# Patient Record
Sex: FEMALE | Race: WHITE | HISPANIC OR LATINO | Employment: UNEMPLOYED | URBAN - METROPOLITAN AREA
[De-identification: names, ages, dates, MRNs, and addresses within clinical notes are randomized per-mention and may not be internally consistent; named-entity substitution may affect disease eponyms.]

---

## 2017-05-09 ENCOUNTER — NON-PROVIDER VISIT (OUTPATIENT)
Dept: OBGYN | Facility: CLINIC | Age: 32
End: 2017-05-09

## 2017-05-09 DIAGNOSIS — Z32.01 POSITIVE PREGNANCY TEST: ICD-10-CM

## 2017-05-09 LAB
INT CON NEG: NEGATIVE
INT CON POS: POSITIVE
POC URINE PREGNANCY TEST: POSITIVE

## 2017-05-09 PROCEDURE — 81025 URINE PREGNANCY TEST: CPT | Performed by: OBSTETRICS & GYNECOLOGY

## 2017-05-11 ENCOUNTER — APPOINTMENT (OUTPATIENT)
Dept: OBGYN | Facility: CLINIC | Age: 32
End: 2017-05-11
Payer: COMMERCIAL

## 2017-06-20 ENCOUNTER — INITIAL PRENATAL (OUTPATIENT)
Dept: OBGYN | Facility: CLINIC | Age: 32
End: 2017-06-20

## 2017-06-20 VITALS
HEIGHT: 63 IN | WEIGHT: 128 LBS | DIASTOLIC BLOOD PRESSURE: 60 MMHG | BODY MASS INDEX: 22.68 KG/M2 | SYSTOLIC BLOOD PRESSURE: 90 MMHG

## 2017-06-20 DIAGNOSIS — O20.0 THREATENED ABORTION: ICD-10-CM

## 2017-06-20 DIAGNOSIS — Z34.80 SUPERVISION OF OTHER NORMAL PREGNANCY, ANTEPARTUM: ICD-10-CM

## 2017-06-20 DIAGNOSIS — Z34.00 SUPERVISION OF NORMAL FIRST PREGNANCY, ANTEPARTUM: ICD-10-CM

## 2017-06-20 LAB
APPEARANCE UR: NORMAL
BILIRUB UR STRIP-MCNC: NORMAL MG/DL
COLOR UR AUTO: NORMAL
GLUCOSE UR STRIP.AUTO-MCNC: NEGATIVE MG/DL
KETONES UR STRIP.AUTO-MCNC: NEGATIVE MG/DL
LEUKOCYTE ESTERASE UR QL STRIP.AUTO: NEGATIVE
NITRITE UR QL STRIP.AUTO: NEGATIVE
PH UR STRIP.AUTO: 5 [PH] (ref 5–8)
PROT UR QL STRIP: NORMAL MG/DL
RBC UR QL AUTO: NORMAL
SP GR UR STRIP.AUTO: 1.02
UROBILINOGEN UR STRIP-MCNC: NORMAL MG/DL

## 2017-06-20 PROCEDURE — 59401 PR NEW OB VISIT: CPT | Performed by: NURSE PRACTITIONER

## 2017-06-20 PROCEDURE — 81002 URINALYSIS NONAUTO W/O SCOPE: CPT | Performed by: NURSE PRACTITIONER

## 2017-06-20 NOTE — PROGRESS NOTES
Pt. Here for NOB visit today.  # [phone]  First prenatal care  Pt. States no complaints.   Pharmacy verified.

## 2017-06-20 NOTE — PROGRESS NOTES
"S:  Kait Talley is a 32 y.o.  who presents for her new OB exam.  She is 13w3d with and FRANKI of Estimated Date of Delivery: 17 by sure LMP. She has no complaints. Denies n/v, vaginal bleeding or leaking of fluid. She is currently not working outside the home. No  heavy lifting or chemical exposure. No ER visits or previous care in this pregnancy.     Too early AFP.  declines CF.  Denies VB, LOF, or cramping.  Denies dysuria, vaginal DC. Reports no fetal movement.     Pt is  and lives with FOB.  Pregnancy is desired.      History reviewed. No pertinent past medical history.  History reviewed. No pertinent family history.  Social History     Social History   • Marital Status:      Spouse Name: N/A   • Number of Children: N/A   • Years of Education: N/A     Occupational History   • Not on file.     Social History Main Topics   • Smoking status: Never Smoker    • Smokeless tobacco: Not on file   • Alcohol Use: No   • Drug Use: No   • Sexual Activity:     Partners: Male     Birth Control/ Protection: Implant      Comment: Pt states had nexplanon removed 2016     Other Topics Concern   • Not on file     Social History Narrative   • No narrative on file     OB History    Para Term  AB SAB TAB Ectopic Multiple Living   3 2 2       2      # Outcome Date GA Lbr Eleuterio/2nd Weight Sex Delivery Anes PTL Lv   3 Current            2 Term 07 40w0d  3.175 kg (7 lb) F Vag-Spont EPI N Y      Comments: Pt states no complications.    1 Term 05 40w0d  3.175 kg (7 lb) F Vag-Spont EPI N Y      Comments: Pt states no complications.           History of HSV I or II in self or partner: no  History of Thyroid problems: no    O:    Filed Vitals:    17 1510   BP: 90/60   Height: 1.6 m (5' 3\")   Weight: 58.06 kg (128 lb)      See Prenatal Physical.    Wet mount: none    No fetal heart tones seen on bedside scan.       A:   1.  IUP @ 13w3d per sure LMP        2.  S=D        3.  See " problem list below        4.  Needs transvaginal viability scan for missed AB vs early IUP       Patient Active Problem List    Diagnosis Date Noted   • Supervision of other normal pregnancy 06/20/2017         P:  1.  GC/CT & pap held at this time until pregnancy verification        2.  Prenatal labs -  Held at this time until pregnancy verification        3.  Discussed PNV, diet, avoidances and adequate water intake        4.  NOB packet given        5.  Return to office for viability scan first available        6.  Complete OB US - TBD            No orders of the defined types were placed in this encounter.

## 2017-06-20 NOTE — MR AVS SNAPSHOT
"        Kait Talley   2017 3:00 PM   Initial Prenatal   MRN: [id number]    [facility]   Dept Phone:  [phone]    Description:  Female : 1985   Provider:  Roseanna Brooks D.N.P.; [facility] INTAKE           Allergies as of 2017     No Known Allergies      You were diagnosed with     Supervision of normal first pregnancy, antepartum   [5913875]       Supervision of other normal pregnancy, antepartum   [7217172]       Threatened    [1912848]         Vital Signs     Blood Pressure Height Weight Body Mass Index Last Menstrual Period Smoking Status    90/60 mmHg 1.6 m (5' 3\") 58.06 kg (128 lb) 22.68 kg/m2 2017 Never Smoker       Basic Information     Date Of Birth Sex Race Ethnicity Preferred Language    1985 Female  or   Origin (Kuwaiti,Peruvian,Tuvaluan,Kuwaiti, etc) Kuwaiti      Problem List              ICD-10-CM Priority Class Noted - Resolved    Supervision of other normal pregnancy Z34.80   2017 - Present    Threatened  O20.0   2017 - Present      Health Maintenance     Patient has no pending health maintenance at this time      Current Immunizations     No immunizations on file.      Below and/or attached are the medications your provider expects you to take. Review all of your home medications and newly ordered medications with your provider and/or pharmacist. Follow medication instructions as directed by your provider and/or pharmacist. Please keep your medication list with you and share with your provider. Update the information when medications are discontinued, doses are changed, or new medications (including over-the-counter products) are added; and carry medication information at all times in the event of emergency situations     Allergies:  No Known Allergies          Medications  Valid as of: 2017 -  3:48 PM    Generic Name Brand Name Tablet Size Instructions for use    Prenatal MV-Min-Fe Fum-FA-DHA "   Take  by mouth.        .                 Medicines prescribed today were sent to:     "[id number]  [place], [place] - [address] AT [facility] OF  [address] [place] [address]    Phone: [phone] Fax: [phone]    Open 24 Hours?: No      Medication refill instructions:       If your prescription bottle indicates you have medication refills left, it is not necessary to call your provider’s office. Please contact your pharmacy and they will refill your medication.    If your prescription bottle indicates you do not have any refills left, you may request refills at any time through one of the following ways: The online [email or web address] system (except Urgent Care), by calling your provider’s office, or by asking your pharmacy to contact your provider’s office with a refill request. Medication refills are processed only during regular business hours and may not be available until the next business day. Your provider may request additional information or to have a follow-up visit with you prior to refilling your medication.   *Please Note: Medication refills are assigned a new Rx number when refilled electronically. Your pharmacy may indicate that no refills were authorized even though a new prescription for the same medication is available at the pharmacy. Please request the medicine by name with the pharmacy before contacting your provider for a refill.        Your To Do List     Future Labs/Procedures Complete By Expires    PREG CNTR PRENATAL PN  As directed 6/21/2018    THINPREP RFLX HPV ASCUS W/CTNG  As directed 6/21/2018    US-OB 2ND 3RD TRI COMPLETE  As directed 12/21/2017         [email or web address] Access Code: [email or web address]  Expires: 7/20/2017  3:48 PM    Your email address is not on file at [facility].  Email Addresses are required for you to sign up for [email or web address], please contact [phone] to verify your personal information and to provide your email address prior to attempting to  register for Symcatt.    Kait Talley  [address] [place] [address]    Gecko Audiohart  A secure, online tool to manage your health information     [employer]’s Virtual Paper® is a secure, online tool that connects you to your personalized health information from the privacy of your home -- day or night - making it very easy for you to manage your healthcare. Once the activation process is completed, you can even access your medical information using the Virtual Paper jakob, which is available for free in the Apple Jakob store or Google Play store.     To learn more about Virtual Paper, visit [email or web address]    There are two levels of access available (as shown below):   My Chart Features  [facility] Primary Care Doctor [facility]  Specialists [facility]  Urgent  Care Non-[facility] Primary Care Doctor   Email your healthcare team securely and privately 24/7 X X X    Manage appointments: schedule your next appointment; view details of past/upcoming appointments X      Request prescription refills. X      View recent personal medical records, including lab and immunizations X X X X   View health record, including health history, allergies, medications X X X X   Read reports about your outpatient visits, procedures, consult and ER notes X X X X   See your discharge summary, which is a recap of your hospital and/or ER visit that includes your diagnosis, lab results, and care plan X X  X     How to register for Symcatt:  Once your e-mail address has been verified, follow the following steps to sign up for Symcatt.     1. Go to  [email or web address]  2. Click on the Sign Up Now box, which takes you to the New Member Sign Up page. You will need to provide the following information:  a. Enter your Virtual Paper Access Code exactly as it appears at the top of this page. (You will not need to use this code after you’ve completed the sign-up process. If you do not sign up before the expiration date, you must request a new code.)   b. Enter your  date of birth.   c. Enter your home email address.   d. Click Submit, and follow the next screen’s instructions.  3. Create a Teburu ID. This will be your Teburu login ID and cannot be changed, so think of one that is secure and easy to remember.  4. Create a [email or web address] password. You can change your password at any time.  5. Enter your Password Reset Question and Answer. This can be used at a later time if you forget your password.   6. Enter your e-mail address. This allows you to receive e-mail notifications when new information is available in Teburu.  7. Click Sign Up. You can now view your health information.    For assistance activating your Teburu account, call [phone]

## 2017-07-03 ENCOUNTER — INITIAL PRENATAL (OUTPATIENT)
Dept: OBGYN | Facility: CLINIC | Age: 32
End: 2017-07-03

## 2017-07-03 VITALS — SYSTOLIC BLOOD PRESSURE: 92 MMHG | DIASTOLIC BLOOD PRESSURE: 60 MMHG

## 2017-07-03 DIAGNOSIS — O02.1 MISSED ABORTION: ICD-10-CM

## 2017-07-03 PROCEDURE — 76817 TRANSVAGINAL US OBSTETRIC: CPT | Performed by: OBSTETRICS & GYNECOLOGY

## 2017-07-03 PROCEDURE — 99214 OFFICE O/P EST MOD 30 MIN: CPT | Mod: 25 | Performed by: OBSTETRICS & GYNECOLOGY

## 2017-07-03 NOTE — PROGRESS NOTES
Confirmation of pregnancy; patient was seen on 17 by Roseanna Brooks CNM-fetal heart tones not seen on ultrasound that time    Kait Talley is a 32 y.o.  here for dysfunctional uterine bleeding. Patient denies bleeding or cramps.    BP 92/60 mmHg  LMP 2017  Breastfeeding? Unknown    Transvaginal ultrasound; as performed and read by myself; intrauterine gestational sac containing fetal pole no cardiac motion crown-rump length consistent with 8 weeks        Impression;  Missed miscarriage in the first trimester    Plan;  Discussed options with patient-recommend D and E but patient does not have insurance  Patient will pass the products of conception on her own  Make follow-up appointment with me in 2 weeks  Miscarriage counseling performed

## 2017-07-03 NOTE — MR AVS SNAPSHOT
Kait Talley   7/3/2017 3:30 PM   Initial Prenatal   MRN: [id number]    Department[facility]  [facility]   Dept Phone:  [phone]    Description:  Female : 1985   Provider:  Tanner Oliver M.D.           Allergies as of 7/3/2017     No Known Allergies      You were diagnosed with     Missed    [632.ICD-9-CM]         Vital Signs     Blood Pressure Last Menstrual Period Breastfeeding? Smoking Status          92/60 mmHg 2017 Unknown Never Smoker         Basic Information     Date Of Birth Sex Race Ethnicity Preferred Language    1985 Female  or   Origin (Ukrainian,South Sudanese,South African,British, etc) Ukrainian      Your appointments     2017 10:30 AM   GYN Visit with SIDNEY TANG   [facility] ([facility])    [address] [place] [address] [address]  [facility] [id number] [phone]              Problem List              ICD-10-CM Priority Class Noted - Resolved    Supervision of other normal pregnancy Z34.80   2017 - Present    Threatened  O20.0   2017 - Present      Health Maintenance     Patient has no pending health maintenance at this time      Current Immunizations     No immunizations on file.      Below and/or attached are the medications your provider expects you to take. Review all of your home medications and newly ordered medications with your provider and/or pharmacist. Follow medication instructions as directed by your provider and/or pharmacist. Please keep your medication list with you and share with your provider. Update the information when medications are discontinued, doses are changed, or new medications (including over-the-counter products) are added; and carry medication information at all times in the event of emergency situations     Allergies:  No Known Allergies          Medications  Valid as of: 2017 -  3:56 PM    Generic Name Brand Name Tablet Size Instructions for use    Prenatal MV-Min-Fe Fum-FA-DHA   Take  by mouth.       .                 Medicines prescribed today were sent to:     [id number]  [place], [place] - [address] AT [facility] OF [address]    [address] [place] [address]    Phone: [phone] Fax: [phone]    Open 24 Hours?: No      Medication refill instructions:       If your prescription bottle indicates you have medication refills left, it is not necessary to call your provider’s office. Please contact your pharmacy and they will refill your medication.    If your prescription bottle indicates you do not have any refills left, you may request refills at any time through one of the following ways: The online [employer] system (except Urgent Care), by calling your provider’s office, or by asking your pharmacy to contact your provider’s office with a refill request. Medication refills are processed only during regular business hours and may not be available until the next business day. Your provider may request additional information or to have a follow-up visit with you prior to refilling your medication.   *Please Note: Medication refills are assigned a new Rx number when refilled electronically. Your pharmacy may indicate that no refills were authorized even though a new prescription for the same medication is available at the pharmacy. Please request the medicine by name with the pharmacy before contacting your provider for a refill.           [employer] Access Code: [email or web address]  Expires: 7/20/2017  3:48 PM    Your email address is not on file at [facility].  Email Addresses are required for you to sign up for [employer], please contact [phone] to verify your personal information and to provide your email address prior to attempting to register for [employer].    Kait Talley  [address] [place] [address]    [employer]  A secure, online tool to manage your health information     [facility]’s [employer]® is a secure, online tool that connects you to your personalized  health information from the privacy of your home -- day or night - making it very easy for you to manage your healthcare. Once the activation process is completed, you can even access your medical information using the ClipClock jakob, which is available for free in the Apple Jakob store or Google Play store.     To learn more about ClipClock, visit [email or web address] websitet    There are two levels of access available (as shown below):   My Chart Features  [facility] Primary Care Doctor [facility]  Specialists [facility]  Urgent  Care Non-[facility] Primary Care Doctor   Email your healthcare team securely and privately 24/7 X X X    Manage appointments: schedule your next appointment; view details of past/upcoming appointments X      Request prescription refills. X      View recent personal medical records, including lab and immunizations X X X X   View health record, including health history, allergies, medications X X X X   Read reports about your outpatient visits, procedures, consult and ER notes X X X X   See your discharge summary, which is a recap of your hospital and/or ER visit that includes your diagnosis, lab results, and care plan X X  X     How to register for ClipClock:  Once your e-mail address has been verified, follow the following steps to sign up for ClipClock.     1. Go to  [email or web address]  2. Click on the Sign Up Now box, which takes you to the New Member Sign Up page. You will need to provide the following information:  a. Enter your ClipClock Access Code exactly as it appears at the top of this page. (You will not need to use this code after you’ve completed the sign-up process. If you do not sign up before the expiration date, you must request a new code.)   b. Enter your date of birth.   c. Enter your home email address.   d. Click Submit, and follow the next screen’s instructions.  3. Create a ClipClock ID. This will be your ClipClock login ID and cannot be changed, so think of one that is secure and easy to  remember.  4. Create a [employer] password. You can change your password at any time.  5. Enter your Password Reset Question and Answer. This can be used at a later time if you forget your password.   6. Enter your e-mail address. This allows you to receive e-mail notifications when new information is available in [employer]eros [employer].  7. Click Sign Up. You can now view your health information.    For assistance activating your [employer] account, call [phone]

## 2017-07-07 ENCOUNTER — HOSPITAL ENCOUNTER (OUTPATIENT)
Dept: LAB | Facility: MEDICAL CENTER | Age: 32
End: 2017-07-07
Attending: PHYSICIAN ASSISTANT
Payer: COMMERCIAL

## 2017-07-07 LAB
ABO GROUP BLD: NORMAL
ALBUMIN SERPL BCP-MCNC: 4.3 G/DL (ref 3.2–4.9)
ALBUMIN/GLOB SERPL: 1.4 G/DL
ALP SERPL-CCNC: 55 U/L (ref 30–99)
ALT SERPL-CCNC: 11 U/L (ref 2–50)
ANION GAP SERPL CALC-SCNC: 9 MMOL/L (ref 0–11.9)
AST SERPL-CCNC: 17 U/L (ref 12–45)
B-HCG SERPL-ACNC: 466 MIU/ML (ref 0–5)
BASOPHILS # BLD AUTO: 0.6 % (ref 0–1.8)
BASOPHILS # BLD AUTO: 0.6 % (ref 0–1.8)
BASOPHILS # BLD: 0.04 K/UL (ref 0–0.12)
BASOPHILS # BLD: 0.04 K/UL (ref 0–0.12)
BILIRUB SERPL-MCNC: 0.4 MG/DL (ref 0.1–1.5)
BUN SERPL-MCNC: 10 MG/DL (ref 8–22)
CALCIUM SERPL-MCNC: 9.3 MG/DL (ref 8.5–10.5)
CHLORIDE SERPL-SCNC: 104 MMOL/L (ref 96–112)
CO2 SERPL-SCNC: 25 MMOL/L (ref 20–33)
CREAT SERPL-MCNC: 0.54 MG/DL (ref 0.5–1.4)
EOSINOPHIL # BLD AUTO: 0.09 K/UL (ref 0–0.51)
EOSINOPHIL # BLD AUTO: 0.16 K/UL (ref 0–0.51)
EOSINOPHIL NFR BLD: 1.4 % (ref 0–6.9)
EOSINOPHIL NFR BLD: 2.2 % (ref 0–6.9)
ERYTHROCYTE [DISTWIDTH] IN BLOOD BY AUTOMATED COUNT: 43.4 FL (ref 35.9–50)
ERYTHROCYTE [DISTWIDTH] IN BLOOD BY AUTOMATED COUNT: 44.7 FL (ref 35.9–50)
GFR SERPL CREATININE-BSD FRML MDRD: >60 ML/MIN/1.73 M 2
GLOBULIN SER CALC-MCNC: 3 G/DL (ref 1.9–3.5)
GLUCOSE SERPL-MCNC: 84 MG/DL (ref 65–99)
HCT VFR BLD AUTO: 38 % (ref 37–47)
HCT VFR BLD AUTO: 39.4 % (ref 37–47)
HGB BLD-MCNC: 12.3 G/DL (ref 12–16)
HGB BLD-MCNC: 13.4 G/DL (ref 12–16)
IMM GRANULOCYTES # BLD AUTO: 0.01 K/UL (ref 0–0.11)
IMM GRANULOCYTES # BLD AUTO: 0.02 K/UL (ref 0–0.11)
IMM GRANULOCYTES NFR BLD AUTO: 0.1 % (ref 0–0.9)
IMM GRANULOCYTES NFR BLD AUTO: 0.3 % (ref 0–0.9)
LIPASE SERPL-CCNC: 15 U/L (ref 11–82)
LYMPHOCYTES # BLD AUTO: 2.09 K/UL (ref 1–4.8)
LYMPHOCYTES # BLD AUTO: 2.2 K/UL (ref 1–4.8)
LYMPHOCYTES NFR BLD: 30.5 % (ref 22–41)
LYMPHOCYTES NFR BLD: 32.4 % (ref 22–41)
MCH RBC QN AUTO: 31.1 PG (ref 27–33)
MCH RBC QN AUTO: 31.9 PG (ref 27–33)
MCHC RBC AUTO-ENTMCNC: 32.4 G/DL (ref 33.6–35)
MCHC RBC AUTO-ENTMCNC: 34 G/DL (ref 33.6–35)
MCV RBC AUTO: 93.8 FL (ref 81.4–97.8)
MCV RBC AUTO: 96.2 FL (ref 81.4–97.8)
MONOCYTES # BLD AUTO: 0.45 K/UL (ref 0–0.85)
MONOCYTES # BLD AUTO: 0.5 K/UL (ref 0–0.85)
MONOCYTES NFR BLD AUTO: 6.9 % (ref 0–13.4)
MONOCYTES NFR BLD AUTO: 7 % (ref 0–13.4)
NEUTROPHILS # BLD AUTO: 3.76 K/UL (ref 2–7.15)
NEUTROPHILS # BLD AUTO: 4.31 K/UL (ref 2–7.15)
NEUTROPHILS NFR BLD: 58.3 % (ref 44–72)
NEUTROPHILS NFR BLD: 59.7 % (ref 44–72)
NRBC # BLD AUTO: 0 K/UL
NRBC # BLD AUTO: 0 K/UL
NRBC BLD AUTO-RTO: 0 /100 WBC
NRBC BLD AUTO-RTO: 0 /100 WBC
PLATELET # BLD AUTO: 231 K/UL (ref 164–446)
PLATELET # BLD AUTO: 231 K/UL (ref 164–446)
PMV BLD AUTO: 10.7 FL (ref 9–12.9)
PMV BLD AUTO: 9.7 FL (ref 9–12.9)
POTASSIUM SERPL-SCNC: 3.5 MMOL/L (ref 3.6–5.5)
PROT SERPL-MCNC: 7.3 G/DL (ref 6–8.2)
RBC # BLD AUTO: 3.95 M/UL (ref 4.2–5.4)
RBC # BLD AUTO: 4.2 M/UL (ref 4.2–5.4)
RH BLD: NORMAL
SODIUM SERPL-SCNC: 138 MMOL/L (ref 135–145)
WBC # BLD AUTO: 6.5 K/UL (ref 4.8–10.8)
WBC # BLD AUTO: 7.2 K/UL (ref 4.8–10.8)

## 2017-07-07 PROCEDURE — 85025 COMPLETE CBC W/AUTO DIFF WBC: CPT

## 2017-07-07 PROCEDURE — 80053 COMPREHEN METABOLIC PANEL: CPT

## 2017-07-07 PROCEDURE — 83690 ASSAY OF LIPASE: CPT

## 2017-07-07 PROCEDURE — 86901 BLOOD TYPING SEROLOGIC RH(D): CPT

## 2017-07-07 PROCEDURE — 86900 BLOOD TYPING SEROLOGIC ABO: CPT

## 2017-07-07 PROCEDURE — 85025 COMPLETE CBC W/AUTO DIFF WBC: CPT | Mod: 91

## 2017-07-07 PROCEDURE — 36415 COLL VENOUS BLD VENIPUNCTURE: CPT

## 2017-07-07 PROCEDURE — 99284 EMERGENCY DEPT VISIT MOD MDM: CPT

## 2017-07-07 PROCEDURE — 84702 CHORIONIC GONADOTROPIN TEST: CPT

## 2017-07-07 ASSESSMENT — PAIN SCALES - GENERAL: PAINLEVEL_OUTOF10: 8

## 2017-07-08 ENCOUNTER — HOSPITAL ENCOUNTER (EMERGENCY)
Facility: MEDICAL CENTER | Age: 32
End: 2017-07-08
Attending: EMERGENCY MEDICINE
Payer: COMMERCIAL

## 2017-07-08 VITALS
BODY MASS INDEX: 21.83 KG/M2 | RESPIRATION RATE: 16 BRPM | HEART RATE: 65 BPM | DIASTOLIC BLOOD PRESSURE: 64 MMHG | SYSTOLIC BLOOD PRESSURE: 109 MMHG | HEIGHT: 64 IN | OXYGEN SATURATION: 98 % | WEIGHT: 127.87 LBS | TEMPERATURE: 98.4 F

## 2017-07-08 DIAGNOSIS — O02.1 MISSED ABORTION WITH FETAL DEMISE BEFORE 20 COMPLETED WEEKS OF GESTATION: ICD-10-CM

## 2017-07-08 DIAGNOSIS — F41.8 SITUATIONAL ANXIETY: ICD-10-CM

## 2017-07-08 DIAGNOSIS — R45.2 UNHAPPINESS: ICD-10-CM

## 2017-07-08 LAB
APPEARANCE UR: CLEAR
BACTERIA #/AREA URNS HPF: ABNORMAL /HPF
BILIRUB UR QL STRIP.AUTO: NEGATIVE
COLOR UR: YELLOW
CULTURE IF INDICATED INDCX: NO UA CULTURE
EPI CELLS #/AREA URNS HPF: NEGATIVE /HPF
GLUCOSE UR STRIP.AUTO-MCNC: NEGATIVE MG/DL
HYALINE CASTS #/AREA URNS LPF: ABNORMAL /LPF
KETONES UR STRIP.AUTO-MCNC: 40 MG/DL
LEUKOCYTE ESTERASE UR QL STRIP.AUTO: NEGATIVE
MICRO URNS: ABNORMAL
NITRITE UR QL STRIP.AUTO: NEGATIVE
PH UR STRIP.AUTO: 6 [PH]
PROT UR QL STRIP: NEGATIVE MG/DL
RBC # URNS HPF: ABNORMAL /HPF
RBC UR QL AUTO: ABNORMAL
SP GR UR STRIP.AUTO: 1.02
WBC #/AREA URNS HPF: ABNORMAL /HPF

## 2017-07-08 PROCEDURE — 81001 URINALYSIS AUTO W/SCOPE: CPT

## 2017-07-08 NOTE — ED NOTES
Chief Complaint   Patient presents with   • RLQ Pain     x1 day. denies bleeding. denies painful urination. 3rd pregnancy     Patient is 12 weeks pregnant.\  Pt ambulatory to triage with above complaint. Pt returned to lobby, educated on triage process, and to inform staff of any changes or concerns.

## 2017-07-08 NOTE — DISCHARGE INSTRUCTIONS
Usted está teniendo un aborto involuntario. Usted puede esperar para pasar el tejido fetal por chadwick cuenta, o puede pedirle a chadwick médico en el [facility] que mat un procedimiento para quitarlo. Por favor llámelos el lunes. Vuelva a la gabriela de emergencias por cualquier sangrado abundante, vómitos incontrolados, fiebre o dolor intenso.    Aborto incompleto  (Incomplete Miscarriage)  Un aborto espontáneo es la pérdida repentina de un bebé en gestación (feto) antes de la semana 20 del embarazo. En un aborto espontáneo, partes del feto o la placenta (alumbramiento) permanecen en el cuerpo.   El aborto espontáneo puede ser julita experiencia que afecte emocionalmente a la persona. Hable con chadwick médico si tiene preguntas sobre el aborto espontáneo, el proceso de duelo y los planes futuros de embarazo.  CAUSAS   · Algunos problemas cromosómicos pueden hacer imposible que el bebé se desarrolle normalmente. Los problemas con los genes o cromosomas del bebé son, en la mayoría de los casos, el resultado de errores que se producen, al payton, cuando el embrión se divide y crece. Estos problemas no se heredan de los padres.  · Infección en el nida del útero.  · Problemas hormonales.  · Problemas en el nida del útero, sree tener un útero incompetente. [place] ocurre cuando los tejidos no son lo suficientemente diana sree para contener el embarazo.  · Problemas del útero, sree un útero con forma anormal, los fibromas o anormalidades congénitas.  · Ciertas enfermedades crónicas.  · No fume, no kyra alcohol, ni consuma drogas.  · Traumatismos.  SÍNTOMAS   · Sangrado o manchado vaginal, con o sin cólicos o dolor.  · Dolor o cólicos en el abdomen o en la cintura.  · Eliminación de líquido, tejidos o coágulos grandes por la vagina.  DIAGNÓSTICO   El médico le hará un examen físico. También le indicará julita ecografía para confirmar el aborto. Es posible que se realicen análisis de gabriele.  TRATAMIENTO   · Generalmente se realiza un  procedimiento de dilatación y curetaje (D y C). Otf el procedimiento de dilatación y curetaje, el nida del útero se abre (dilata) y se retira todo nisreen de tejido fetal o placentario del útero.  · Si hay julita infección, le recetarán antibióticos. Posiblemente le receten otros medicamentos para reducir (contraer) el tamaño del útero si hay mucha hemorragia.  · Si chadwick tipo de gabriele es Rh negativo y el del bebé es Rh positivo, necesitará julita inyección de inmunoglobulina Rho(D). Esta inyección protegerá a los futuros bebés de tener problemas de compatibilidad Rh en futuros embarazos.  · Probablemente le indiquen reposo. [place] significa que debe quedarse en cama y levantarse únicamente para ir al baño.  INSTRUCCIONES PARA EL CUIDADO EN EL HOGAR   · Mat reposo según las indicaciones del médico.  · Limite las actividades según las indicaciones del médico. Es posible que se le permita retomar las actividades livianas si no se le realizó un curetaje, quang necesitará tratamiento adicional.  · Lleve un registro de la cantidad de toallas sanitarias que usa por día. Observe cuán impregnadas (saturadas) están. Registre esta información.  · No  use tampones.  · No se mat duchas vaginales ni tenga relaciones sexuales hasta que el médico la autorice.  · Asista a todas las citas de seguimiento para julita nueva evaluación y para continuar el tratamiento.  · Sólo tome medicamentos de venta jyoti o recetados para calmar el dolor, el malestar o bajar la fiebre, según las indicaciones de chadwick médico.  · [place] los antibióticos sree le indicó el médico. Asegúrese de que finaliza la prescripción completa aunque se sienta mejor.  SOLICITE ATENCIÓN MÉDICA DE INMEDIATO SI:   · Siente calambres intensos en el estómago, en la espalda o en el abdomen.  · Le sube la fiebre sin motivo (asegúrese de registrar las cifras).  · Elimina coágulos grandes o tejidos (consérvelos para que el médico los analice).  · La hemorragia aumenta.  · Se siente  mareada, débil o tiene episodios de desmayo.  ASEGÚRESE DE QUE:   · Comprende estas instrucciones.  · Controlará chadwick afección.  · Recibirá ayuda de inmediato si no mejora o si empeora.     Esta información no tiene sree fin reemplazar el consejo del médico. Asegúrese de hacerle al médico cualquier pregunta que tenga.     Document Released: 12/18/2006 Document Revised: 10/08/2014  Elsevier Interactive Patient Education ©2016 Elsevier Inc.

## 2017-07-08 NOTE — ED AVS SNAPSHOT
Home Care Instructions                                                                                                                Kait Talley   MRN: [id number]    Department:  [facility], Emergency Dept   Date of Visit:  2017            [facility], Emergency Dept    [address] [address]    Phone:  [phone]      You were seen by     Zeus Calle M.D.      Your Diagnosis Was     Missed  with fetal demise before 20 completed weeks of gestation     O02.1       Follow-up Information     1. Follow up with Pregnancy KARIME Sharp. Call in 1 day.    Specialty:  OB/Gyn    Contact information    [address] [address] [phone]        Medication Information     Review all of your home medications and newly ordered medications with your primary doctor and/or pharmacist as soon as possible. Follow medication instructions as directed by your doctor and/or pharmacist.     Please keep your complete medication list with you and share with your physician. Update the information when medications are discontinued, doses are changed, or new medications (including over-the-counter products) are added; and carry medication information at all times in the event of emergency situations.               Medication List      ASK your doctor about these medications        Instructions    Morning Afternoon Evening Bedtime    PRENATAL 1 PO        Take  by mouth.                                Procedures and tests performed during your visit     CARDIAC MONITORING    CBC WITH DIFFERENTIAL    COMP METABOLIC PANEL    ESTIMATED GFR    LIPASE    O2 Protocol    SALINE LOCK    URINALYSIS,CULTURE IF INDICATED    URINE MICROSCOPIC (W/UA)        Discharge Instructions       Usted está teniendo un aborto involuntario. Usted puede esperar para pasar el tejido fetal por chadwick cuenta, o puede pedirle a chadwick médico en el [facility] que mat un  procedimiento para quitarlo. Por favor llámelos el lunes. Vuelva a la gabriela de emergencias por cualquier sangrado abundante, vómitos incontrolados, fiebre o dolor intenso.    Aborto incompleto  (Incomplete Miscarriage)  Un aborto espontáneo es la pérdida repentina de un bebé en gestación (feto) antes de la semana 20 del embarazo. En un aborto espontáneo, partes del feto o la placenta (alumbramiento) permanecen en el cuerpo.   El aborto espontáneo puede ser julita experiencia que afecte emocionalmente a la persona. Hable con chadwick médico si tiene preguntas sobre el aborto espontáneo, el proceso de duelo y los planes futuros de embarazo.  CAUSAS   · Algunos problemas cromosómicos pueden hacer imposible que el bebé se desarrolle normalmente. Los problemas con los genes o cromosomas del bebé son, en la mayoría de los casos, el resultado de errores que se producen, al payton, cuando el embrión se divide y crece. Estos problemas no se heredan de los padres.  · Infección en el nida del útero.  · Problemas hormonales.  · Problemas en el nida del útero, sree tener un útero incompetente. Slaton ocurre cuando los tejidos no son lo suficientemente diana sree para contener el embarazo.  · Problemas del útero, sree un útero con forma anormal, los fibromas o anormalidades congénitas.  · Ciertas enfermedades crónicas.  · No fume, no kyra alcohol, ni consuma drogas.  · Traumatismos.  SÍNTOMAS   · Sangrado o manchado vaginal, con o sin cólicos o dolor.  · Dolor o cólicos en el abdomen o en la cintura.  · Eliminación de líquido, tejidos o coágulos grandes por la vagina.  DIAGNÓSTICO   El médico le hará un examen físico. También le indicará julita ecografía para confirmar el aborto. Es posible que se realicen análisis de gabriele.  TRATAMIENTO   · Generalmente se realiza un procedimiento de dilatación y curetaje (D y C). Otf el procedimiento de dilatación y curetaje, el nida del útero se abre (dilata) y se retira todo nisreen de tejido fetal  o placentario del útero.  · Si hay julita infección, le recetarán antibióticos. Posiblemente le receten otros medicamentos para reducir (contraer) el tamaño del útero si hay mucha hemorragia.  · Si chadwick tipo de gabriele es Rh negativo y el del bebé es Rh positivo, necesitará julita inyección de inmunoglobulina Rho(D). Esta inyección protegerá a los futuros bebés de tener problemas de compatibilidad Rh en futuros embarazos.  · Probablemente le indiquen reposo. [place] significa que debe quedarse en cama y levantarse únicamente para ir al baño.  INSTRUCCIONES PARA EL CUIDADO EN EL HOGAR   · Mat reposo según las indicaciones del médico.  · Limite las actividades según las indicaciones del médico. Es posible que se le permita retomar las actividades livianas si no se le realizó un curetaje, quang necesitará tratamiento adicional.  · Lleve un registro de la cantidad de toallas sanitarias que usa por día. Observe cuán impregnadas (saturadas) están. Registre esta información.  · No  use tampones.  · No se mat duchas vaginales ni tenga relaciones sexuales hasta que el médico la autorice.  · Asista a todas las citas de seguimiento para julita nueva evaluación y para continuar el tratamiento.  · Sólo tome medicamentos de venta jyoti o recetados para calmar el dolor, el malestar o bajar la fiebre, según las indicaciones de chadwick médico.  · Los [place] los antibióticos sree le indicó el médico. Asegúrese de que finaliza la prescripción completa aunque se sienta mejor.  SOLICITE ATENCIÓN MÉDICA DE INMEDIATO SI:   · Siente calambres intensos en el estómago, en la espalda o en el abdomen.  · Le sube la fiebre sin motivo (asegúrese de registrar las cifras).  · Elimina coágulos grandes o tejidos (consérvelos para que el médico los analice).  · La hemorragia aumenta.  · Se siente mareada, débil o tiene episodios de desmayo.  ASEGÚRESE DE QUE:   · Comprende estas instrucciones.  · Controlará chadwick afección.  · Recibirá ayuda de inmediato si no mejora o si  empeora.     Esta información no tiene sree fin reemplazar el consejo del médico. Asegúrese de hacerle al médico cualquier pregunta que tenga.     Document Released: 12/18/2006 Document Revised: 10/08/2014  Elsevier Interactive Patient Education ©2016 [employer]            Patient Information     Patient Information    Following emergency treatment: all patient requiring follow-up care must return either to a private physician or a clinic if your condition worsens before you are able to obtain further medical attention, please return to the emergency room.     Billing Information    At [facility], we work to make the billing process streamlined for our patients.  Our Representatives are here to answer any questions you may have regarding your hospital bill.  If you have insurance coverage and have supplied your insurance information to us, we will submit a claim to your insurer on your behalf.  Should you have any questions regarding your bill, we can be reached online or by phone as follows:  Online: You are able pay your bills online or live chat with our representatives about any billing questions you may have. We are here to help Monday - Friday from 8:00am to 7:30pm and 9:00am - 12:00pm on Saturdays.  Please visit [email or web address]  for more information.   Phone:  [phone] or [phone]    Please note that your emergency physician, surgeon, pathologist, radiologist, anesthesiologist, and other specialists are not employed by [facility] and will therefore bill separately for their services.  Please contact them directly for any questions concerning their bills at the numbers below:     Emergency Physician Services:  [phone]  [facility]:  [phone]  [facility]:  [phone]  [facility]:  [phone]    1. Your final bill may vary from the amount quoted upon discharge if all procedures are not complete at  that time, or if your doctor has additional procedures of which we are not aware. You will receive an additional bill if you return to the Emergency Department at [facility] for suture removal regardless of the facility of which the sutures were placed.     2. Please arrange for settlement of this account at the emergency registration.    3. All self-pay accounts are due in full at the time of treatment.  If you are unable to meet this obligation then payment is expected within 4-5 days.     4. If you have had radiology studies (CT, X-ray, Ultrasound, MRI), you have received a preliminary result during your emergency department visit. Please contact the radiology department [phone] to receive a copy of your final result. Please discuss the Final result with your primary physician or with the follow up physician provided.     Crisis Hotline:  [place] Crisis Hotline:  7-882-OXYOLHU or 1-505.111.5737  [place] Crisis Hotline:    1-108.482.5341 or 689-490-4017         ED Discharge Follow Up Questions    1. In order to provide you with very good care, we would like to follow up with a phone call in the next few days.  May we have your permission to contact you?     YES /  NO    2. What is the best phone number to call you? (       )_____-__________    3. What is the best time to call you?      Morning  /  Afternoon  /  Evening                   Patient Signature:  ____________________________________________________________    Date:  ____________________________________________________________      Your appointments     Jul 26, 2017 10:30 AM   GYN Visit with SIDNEY TANG   [facility])    [address] [address]   [phone]

## 2017-07-08 NOTE — ED NOTES
Patient having difficulty understanding diagnosis, requesting to speak to ERP again; ERP notified.

## 2017-07-08 NOTE — ED NOTES
Patient primarily Bengali speaking; Pt reported vaginal bleeding to Triage Tech.  Patient to triage with RN, reports cramping x1 day and bleeding onset 2300.  Patient given specimen cup for urine collection.      Chief Complaint   Patient presents with   • RLQ Pain     x1 day. denies bleeding. denies painful urination. 3rd pregnancy   • Pregnancy     approximate 12 weeks   • Cramping     x1 day   • Vaginal Bleeding     onset at 2300 while in triage

## 2017-07-08 NOTE — ED AVS SNAPSHOT
[employer] Access Code: [email or web address]  Expires: 7/20/2017  3:48 PM    Your email address is not on file at [facility].  Email Addresses are required for you to sign up for [employer], please contact [phone] to verify your personal information and to provide your email address prior to attempting to register for [employer].    Kait Talley  [address] [place] [address]    [employer]  A secure, online tool to manage your health information     [facility]’s [employer]® is a secure, online tool that connects you to your personalized health information from the privacy of your home -- day or night - making it very easy for you to manage your healthcare. Once the activation process is completed, you can even access your medical information using the [employer] jakob, which is available for free in the Apple Jakob store or Google Play store.     To learn more about [employer], visit [email or web address]    There are two levels of access available (as shown below):   My Chart Features  [facility] Primary Care Doctor [facility]  Specialists [facility]  Urgent  Care Non-[facility] Primary Care Doctor   Email your healthcare team securely and privately 24/7 X X X    Manage appointments: schedule your next appointment; view details of past/upcoming appointments X      Request prescription refills. X      View recent personal medical records, including lab and immunizations X X X X   View health record, including health history, allergies, medications X X X X   Read reports about your outpatient visits, procedures, consult and ER notes X X X X   See your discharge summary, which is a recap of your hospital and/or ER visit that includes your diagnosis, lab results, and care plan X X  X     How to register for [employer]:  Once your e-mail address has been verified, follow the following steps to sign up for [employer].     1. Go to  [email or web address]  2. Click on the Sign Up Now box, which takes you to the New Member Sign Up page.  You will need to provide the following information:  a. Enter your [employer] Access Code exactly as it appears at the top of this page. (You will not need to use this code after you’ve completed the sign-up process. If you do not sign up before the expiration date, you must request a new code.)   b. Enter your date of birth.   c. Enter your home email address.   d. Click Submit, and follow the next screen’s instructions.  3. Create a Last Sizet ID. This will be your [employer] login ID and cannot be changed, so think of one that is secure and easy to remember.  4. Create a [employer] password. You can change your password at any time.  5. Enter your Password Reset Question and Answer. This can be used at a later time if you forget your password.   6. Enter your e-mail address. This allows you to receive e-mail notifications when new information is available in [employer].  7. Click Sign Up. You can now view your health information.    For assistance activating your [employer] account, call [phone]

## 2017-07-08 NOTE — ED AVS SNAPSHOT
7/8/2017    Kait Talley  [address]  [place] [address]    Dear Kait:    [facility] wants to ensure your discharge home is safe and you or your loved ones have had all of your questions answered regarding your care after you leave the hospital.    Below is a list of resources and contact information should you have any questions regarding your hospital stay, follow-up instructions, or active medical symptoms.    Questions or Concerns Regarding… Contact   Medical Questions Related to Your Discharge  (7 days a week, 8am-5pm) Contact a Nurse Care Coordinator   [phone]   Medical Questions Not Related to Your Discharge  (24 hours a day / 7 days a week)  Contact the Nurse Health Line   [phone]    Medications or Discharge Instructions Refer to your discharge packet   or contact your [facility] Primary Care Provider   [phone]   Follow-up Appointment(s) Schedule your appointment via [employer]   or contact Scheduling [phone]   Billing Review your statement via [employer]  or contact Billing [phone]   Medical Records Review your records via [employer]   or contact Medical Records [phone]     You may receive a telephone call within two days of discharge. This call is to make certain you understand your discharge instructions and have the opportunity to have any questions answered. You can also easily access your medical information, test results and upcoming appointments via the [employer] free online health management tool. You can learn more and sign up at [email or web address]. For assistance setting up your [employer] account, please call [phone].    Once again, we want to ensure your discharge home is safe and that you have a clear understanding of any next steps in your care. If you have any questions or concerns, please do not hesitate to contact us, we are here for you. Thank you for choosing [facility] for your healthcare needs.    Sincerely,    Your [facility] Healthcare Team

## 2017-07-08 NOTE — ED NOTES
Discharge instructions given to patient; patient verbalized understanding. VS WNL for patient's position and baseline. Patient ambulatory w/ steady gait upon leaving ED w/ family.

## 2017-07-13 ENCOUNTER — HOSPITAL ENCOUNTER (OUTPATIENT)
Dept: LAB | Facility: MEDICAL CENTER | Age: 32
End: 2017-07-13
Attending: PHYSICIAN ASSISTANT
Payer: COMMERCIAL

## 2017-07-13 LAB — B-HCG SERPL-ACNC: 304 MIU/ML (ref 0–5)

## 2017-07-13 PROCEDURE — 84702 CHORIONIC GONADOTROPIN TEST: CPT

## 2017-07-13 PROCEDURE — 36415 COLL VENOUS BLD VENIPUNCTURE: CPT

## 2017-07-26 ENCOUNTER — GYNECOLOGY VISIT (OUTPATIENT)
Dept: OBGYN | Facility: CLINIC | Age: 32
End: 2017-07-26
Payer: COMMERCIAL

## 2017-07-26 VITALS — WEIGHT: 128 LBS | DIASTOLIC BLOOD PRESSURE: 60 MMHG | SYSTOLIC BLOOD PRESSURE: 104 MMHG | BODY MASS INDEX: 21.96 KG/M2

## 2017-07-26 DIAGNOSIS — O02.1 MISSED ABORTION: ICD-10-CM

## 2017-07-26 PROCEDURE — 99213 OFFICE O/P EST LOW 20 MIN: CPT | Performed by: OBSTETRICS & GYNECOLOGY

## 2017-07-26 NOTE — PROGRESS NOTES
Confirmation of pregnancy;    Kait Talley is a 32 y.o.  here for dysfunctional uterine bleeding. Patient denies bleeding or cramps. The patient was diagnosed with missed miscarriage in the first trimester on ultrasound by myself on 7/3/17-she's had a couple of episodes of slight spotting but no cramps and minimal bleeding. No passage of tissue. The patient was offered dilatation and evacuation of the uterus on 7/3/17 that she cannot afford the procedure and has no insurance.    HCG titer-466 on   HCG titer-304 on     /60 mmHg  Wt 58.06 kg (128 lb)  LMP 2017    Transvaginal ultrasound; as performed and read by myself; see previous ultrasound report from 7/3/17 by myself        Impression;  Missed miscarriage due to blighted ovum    Plan;  Follow-up with me in 2 weeks  Miscarriage precautions given

## 2017-08-01 ENCOUNTER — HOSPITAL ENCOUNTER (OUTPATIENT)
Facility: MEDICAL CENTER | Age: 32
End: 2017-08-01
Attending: SPECIALIST | Admitting: SPECIALIST
Payer: COMMERCIAL

## 2017-08-01 VITALS
BODY MASS INDEX: 25.02 KG/M2 | DIASTOLIC BLOOD PRESSURE: 62 MMHG | RESPIRATION RATE: 16 BRPM | TEMPERATURE: 98.7 F | WEIGHT: 127.43 LBS | SYSTOLIC BLOOD PRESSURE: 93 MMHG | HEART RATE: 67 BPM | OXYGEN SATURATION: 98 % | HEIGHT: 60 IN

## 2017-08-01 PROCEDURE — 502240 HCHG MISC OR SUPPLY RC 0272: Performed by: SPECIALIST

## 2017-08-01 PROCEDURE — A9270 NON-COVERED ITEM OR SERVICE: HCPCS

## 2017-08-01 PROCEDURE — 160009 HCHG ANES TIME/MIN: Performed by: SPECIALIST

## 2017-08-01 PROCEDURE — 500342 HCHG CURRETTE TIP, VAC CURV: Performed by: SPECIALIST

## 2017-08-01 PROCEDURE — 500766 HCHG KIT, D & E COLLECTION: Performed by: SPECIALIST

## 2017-08-01 PROCEDURE — 700111 HCHG RX REV CODE 636 W/ 250 OVERRIDE (IP)

## 2017-08-01 PROCEDURE — 502587 HCHG PACK, D&C: Performed by: SPECIALIST

## 2017-08-01 PROCEDURE — A9270 NON-COVERED ITEM OR SERVICE: HCPCS | Performed by: SPECIALIST

## 2017-08-01 PROCEDURE — 700102 HCHG RX REV CODE 250 W/ 637 OVERRIDE(OP)

## 2017-08-01 PROCEDURE — 160002 HCHG RECOVERY MINUTES (STAT): Performed by: SPECIALIST

## 2017-08-01 PROCEDURE — 160048 HCHG OR STATISTICAL LEVEL 1-5: Performed by: SPECIALIST

## 2017-08-01 PROCEDURE — 160028 HCHG SURGERY MINUTES - 1ST 30 MINS LEVEL 3: Performed by: SPECIALIST

## 2017-08-01 PROCEDURE — 700102 HCHG RX REV CODE 250 W/ 637 OVERRIDE(OP): Performed by: SPECIALIST

## 2017-08-01 PROCEDURE — 160036 HCHG PACU - EA ADDL 30 MINS PHASE I: Performed by: SPECIALIST

## 2017-08-01 PROCEDURE — 88305 TISSUE EXAM BY PATHOLOGIST: CPT

## 2017-08-01 PROCEDURE — 160035 HCHG PACU - 1ST 60 MINS PHASE I: Performed by: SPECIALIST

## 2017-08-01 RX ORDER — METHYLERGONOVINE MALEATE 0.2 MG/ML
INJECTION INTRAVENOUS
Status: COMPLETED
Start: 2017-08-01 | End: 2017-08-01

## 2017-08-01 RX ORDER — ONDANSETRON 2 MG/ML
4 INJECTION INTRAMUSCULAR; INTRAVENOUS EVERY 6 HOURS PRN
Status: DISCONTINUED | OUTPATIENT
Start: 2017-08-01 | End: 2017-08-01 | Stop reason: HOSPADM

## 2017-08-01 RX ORDER — SODIUM CHLORIDE, SODIUM LACTATE, POTASSIUM CHLORIDE, CALCIUM CHLORIDE 600; 310; 30; 20 MG/100ML; MG/100ML; MG/100ML; MG/100ML
INJECTION, SOLUTION INTRAVENOUS CONTINUOUS
Status: DISCONTINUED | OUTPATIENT
Start: 2017-08-01 | End: 2017-08-01 | Stop reason: HOSPADM

## 2017-08-01 RX ORDER — ACETAMINOPHEN 500 MG
1000 TABLET ORAL EVERY 6 HOURS
Status: DISCONTINUED | OUTPATIENT
Start: 2017-08-01 | End: 2017-08-01 | Stop reason: HOSPADM

## 2017-08-01 RX ORDER — OXYCODONE HCL 5 MG/5 ML
SOLUTION, ORAL ORAL
Status: COMPLETED
Start: 2017-08-01 | End: 2017-08-01

## 2017-08-01 RX ORDER — SIMETHICONE 80 MG
80 TABLET,CHEWABLE ORAL EVERY 8 HOURS PRN
Status: DISCONTINUED | OUTPATIENT
Start: 2017-08-01 | End: 2017-08-01 | Stop reason: HOSPADM

## 2017-08-01 RX ORDER — METOCLOPRAMIDE HYDROCHLORIDE 5 MG/ML
10 INJECTION INTRAMUSCULAR; INTRAVENOUS EVERY 4 HOURS PRN
Status: DISCONTINUED | OUTPATIENT
Start: 2017-08-01 | End: 2017-08-01 | Stop reason: HOSPADM

## 2017-08-01 RX ORDER — METHYLERGONOVINE MALEATE 0.2 MG/ML
0.2 INJECTION INTRAVENOUS ONCE
Status: DISCONTINUED | OUTPATIENT
Start: 2017-08-01 | End: 2017-08-01 | Stop reason: HOSPADM

## 2017-08-01 RX ORDER — OXYCODONE HYDROCHLORIDE 5 MG/1
10 TABLET ORAL
Status: DISCONTINUED | OUTPATIENT
Start: 2017-08-01 | End: 2017-08-01 | Stop reason: HOSPADM

## 2017-08-01 RX ADMIN — SODIUM CHLORIDE, SODIUM LACTATE, POTASSIUM CHLORIDE, CALCIUM CHLORIDE 1000 ML: 600; 310; 30; 20 INJECTION, SOLUTION INTRAVENOUS at 12:45

## 2017-08-01 RX ADMIN — OXYCODONE HYDROCHLORIDE 5 MG: 5 SOLUTION ORAL at 17:00

## 2017-08-01 RX ADMIN — METHYLERGONOVINE MALEATE 0.2 MG: 0.2 INJECTION, SOLUTION INTRAMUSCULAR; INTRAVENOUS at 17:00

## 2017-08-01 ASSESSMENT — PAIN SCALES - GENERAL
PAINLEVEL_OUTOF10: 3
PAINLEVEL_OUTOF10: 0
PAINLEVEL_OUTOF10: 3
PAINLEVEL_OUTOF10: 4
PAINLEVEL_OUTOF10: 3
PAINLEVEL_OUTOF10: 0
PAINLEVEL_OUTOF10: 4

## 2017-08-01 NOTE — OR SURGEON
Operative Report    PreOp Diagnosis: Missed     PostOp Diagnosis: same; final pathology pending    Procedure(s):  DILATION AND EVACUATION - Wound Class: Clean Contaminated    Surgeon(s):  David Phipps M.D.    Anesthesiologist/Type of Anesthesia:  Anesthesiologist: Cristian Rolon M.D./General    Surgical Staff:  Circulator: Paulina Aldana R.N.  Scrub Person: Lilly Jackson    Specimens:  Intrauterine contents    Estimated Blood Loss: less than 10ccs    Findings: Small uterus with good uterine cry at end of the procedure    Complications:none        2017 4:32 PM David Phipps

## 2017-08-01 NOTE — IP AVS SNAPSHOT
Home Care Instructions                                                                                                                Name:Kait Talley  Medical Record Number:[id number]  CSN: [id number]    YOB: 1985   Age: 32 y.o.  Sex: female  HT:1.524 m (5') WT: 57.8 kg (127 lb 6.8 oz)          Admit Date: 8/1/2017     Discharge Date:   Today's Date: 8/1/2017  Attending Doctor:  David Phipps M.D.                  Allergies:  Review of patient's allergies indicates no known allergies.                Discharge Instructions         ACTIVITY: Rest and take it easy for the first 24 hours.  A responsible adult is recommended to remain with you during that time.  It is normal to feel sleepy.  We encourage you to not do anything that requires balance, judgment or coordination.    MILD FLU-LIKE SYMPTOMS ARE NORMAL. YOU MAY EXPERIENCE GENERALIZED MUSCLE ACHES, THROAT IRRITATION, HEADACHE AND/OR SOME NAUSEA.    FOR 24 HOURS DO NOT:  Drive, operate machinery or run household appliances.  Drink beer or alcoholic beverages.   Make important decisions or sign legal documents.    SPECIAL INSTRUCTIONS: *PLEASE SEE INSTRUCTION SHEET**    DIET: To avoid nausea, slowly advance diet as tolerated, avoiding spicy or greasy foods for the first day.  Add more substantial food to your diet according to your physician's instructions.  Babies can be fed formula or breast milk as soon as they are hungry.  INCREASE FLUIDS AND FIBER TO AVOID CONSTIPATION.    SURGICAL DRESSING/BATHING: *OK TO SHOWER.  NO TUB BATHS, HOT TUBS OR SOAKING**    FOLLOW-UP APPOINTMENT:  A follow-up appointment should be arranged with your doctor; call to schedule.    You should CALL YOUR PHYSICIAN if you develop:  Fever greater than 101 degrees F.  Pain not relieved by medication, or persistent nausea or vomiting.  Excessive bleeding (blood soaking through dressing) or unexpected drainage from the wound.  Extreme redness or swelling around the  incision site, drainage of pus or foul smelling drainage.  Inability to urinate or empty your bladder within 8 hours.  Problems with breathing or chest pain.    You should call 911 if you develop problems with breathing or chest pain.  If you are unable to contact your doctor or surgical center, you should go to the nearest emergency room or urgent care center.    Physician's telephone #: *[phone]**    If any questions arise, call your doctor.  If your doctor is not available, please feel free to call the Surgical Center at [phone].  The Center is open Monday through Friday from 7AM to 7PM.  You can also call the [employer] HOTLINE open 24 hours/day, 7 days/week and speak to a nurse at [phone], or toll free at [phone].    A registered nurse may call you a few days after your surgery to see how you are doing after your procedure.    MEDICATIONS: Resume taking daily medication.  Take prescribed pain medication with food.  If no medication is prescribed, you may take non-aspirin pain medication if needed.  PAIN MEDICATION CAN BE VERY CONSTIPATING.  Take a stool softener or laxative such as senokot, pericolace, or milk of magnesia if needed.     Last pain medication given at *5:00 P.M. NEXT DOSE OF PAIN MEDICATION MAY BE GIVEN AT 9:00 P.M.**.    If your physician has prescribed pain medication that includes Acetaminophen (Tylenol), do not take additional Acetaminophen (Tylenol) while taking the prescribed medication.    Depression / Suicide Risk    As you are discharged from this [facility] facility, it is important to learn how to keep safe from harming yourself.    Recognize the warning signs:  · Abrupt changes in personality, positive or negative- including increase in energy   · Giving away possessions  · Change in eating patterns- significant weight changes-  positive or negative  · Change in sleeping patterns- unable to sleep or sleeping all the time   · Unwillingness or inability to  communicate  · Depression  · Unusual sadness, discouragement and loneliness  · Talk of wanting to die  · Neglect of personal appearance   · Rebelliousness- reckless behavior  · Withdrawal from people/activities they love  · Confusion- inability to concentrate     If you or a loved one observes any of these behaviors or has concerns about self-harm, here's what you can do:  · Talk about it- your feelings and reasons for harming yourself  · Remove any means that you might use to hurt yourself (examples: pills, rope, extension cords, firearm)  · Get professional help from the community (Mental Health, Substance Abuse, psychological counseling)  · Do not be alone:Call your Safe Contact- someone whom you trust who will be there for you.  · Call your local CRISIS HOTLINE 114-2959 or 829-618-5657  · Call your local Children's Mobile Crisis Response Team [place] (763) 062-1285 or www.[employer]  · Call the toll free National Suicide Prevention Hotlines   · National Suicide Prevention Lifeline 325-381-HLNM (4527)  · [place] 24/7 Hope Line Network 800-SUICIDE (774-8647)       Medication List      Notice     You have not been prescribed any medications.            Medication Information     Above and/or attached are the medications your physician expects you to take upon discharge. Review all of your home medications and newly ordered medications with your doctor and/or pharmacist. Follow medication instructions as directed by your doctor and/or pharmacist. Please keep your medication list with you and share with your physician. Update the information when medications are discontinued, doses are changed, or new medications (including over-the-counter products) are added; and carry medication information at all times in the event of emergency situations.        Resources     Quit Smoking / Tobacco Use:    I understand the use of any tobacco products increases my chance of suffering from future heart disease or stroke  and could cause other illnesses which may shorten my life. Quitting the use of tobacco products is the single most important thing I can do to improve my health. For further information on smoking / tobacco cessation call a Toll Free Quit Line at 1-153.781.6179 (*National [employer] [place]) or 1-574.581.4154 (American Lung Association) or you can access the web based program at www.lungusa.org.    [place] Tobacco Users Help Line:  (756) 543-9176       Toll Free: 1-661.273.1065  Quit Tobacco Program [facility] (971)686-5399    Crisis Hotline:    [place] Crisis Hotline:  3-671-YTYUTQF or 1-220.186.6507    [place] Crisis Hotline:    1-959.463.4086 or 872-846-4569    Discharge Survey:   Thank you for choosing [facility]. We hope we did everything we could to make your hospital stay a pleasant one. You may be receiving a survey and we would appreciate your time and participation in answering the questions. Your input is very valuable to us in our efforts to improve our service to our patients and their families.            Signatures     My signature on this form indicates that:    1. I acknowledge receipt and understanding of these Home Care Instruction.  2. My questions regarding this information have been answered to my satisfaction.  3. I have formulated a plan with my discharge nurse to obtain my prescribed medications for home.    __________________________________      __________________________________                   Patient Signature                                 Guardian/Responsible Adult Signature      __________________________________                 __________       ________                       Nurse Signature                                               Date                 Time

## 2017-08-01 NOTE — IP AVS SNAPSHOT
8/1/2017    Kait Talley  [address]  [facility] [address]    Dear Kait:    [facility] wants to ensure your discharge home is safe and you or your loved ones have had all of your questions answered regarding your care after you leave the hospital.    Below is a list of resources and contact information should you have any questions regarding your hospital stay, follow-up instructions, or active medical symptoms.    Questions or Concerns Regarding… Contact   Medical Questions Related to Your Discharge  (7 days a week, 8am-5pm) Contact a Nurse Care Coordinator   [phone]   Medical Questions Not Related to Your Discharge  (24 hours a day / 7 days a week)  Contact the Nurse Health Line   [phone]    Medications or Discharge Instructions Refer to your discharge packet   or contact your [facility] Primary Care Provider   [phone]   Follow-up Appointment(s) Schedule your appointment via [employer]   or contact Scheduling [phone]   Billing Review your statement via [employer]  or contact Billing [phone]   Medical Records Review your records via [employer]   or contact Medical Records [phone]     You may receive a telephone call within two days of discharge. This call is to make certain you understand your discharge instructions and have the opportunity to have any questions answered. You can also easily access your medical information, test results and upcoming appointments via the [employer] free online health management tool. You can learn more and sign up at [employer]. For assistance setting up your [employer] account, please call [phone].    Once again, we want to ensure your discharge home is safe and that you have a clear understanding of any next steps in your care. If you have any questions or concerns, please do not hesitate to contact us, we are here for you. Thank you for choosing [facility] for your healthcare needs.    Sincerely,    Your [facility] Healthcare Team

## 2017-08-01 NOTE — OR NURSING
RECEIVED FROM OR WITH DR MENDEZ.  PATIENT TEARY.  VSS.  FAMILY BROUGHT TO BEDSIDE.  PATIENT HAVING EXCESSIVE BLEEDING.  CHUCKS AND PAD CHANGED.  DR PEREZ BROUGHT TO BEDSIDE.    1700 GIVEN ORAL PAIN MEDICATION FOR CRAMPING AND METHERGINE IM PER ORDER. FAMILY MEMBER BROUGHT TO BEDSIDE.  1735 UP TO BATHROOM.  VOID WITHOUT PROBLEM.  BLEEDING IMPROVING.   AND DAUGHTER BROUGHT TO BEDSIDE.  1800 UP TO BATHROOM.  MINIMAL BLEEDING ON JIMBO PAD.    1820  DISCHARGE INSTRUCTIONS TO PATIENT AND FAMILY.  TRANSLATED BY FAMILY MEMBER TO PATIENT.  PATIENT DENIES PAIN/NAUSEA.    1830  UP AND DRESSED.  AMBULATES TO BATHROOM.  MINIMAL BLEEDING ON JIMBO PAD.    1845 PATIENT FEELS READY TO DISCHARGE.  ALL QUESTIONS ANSWERED.  DISCHARGED TO HOME.

## 2017-08-02 NOTE — OP REPORT
DATE OF SERVICE:  2017    PREOPERATIVE DIAGNOSIS:  Missed , pelvic pain.    POSTOPERATIVE DIAGNOSIS:  Missed , pelvic pain.    FINAL PATHOLOGY:  Pending.    PROCEDURE:  Dilation, suction, curettage.    SURGEON:  David Phipps MD    ANESTHESIA:  General.    ANESTHESIOLOGIST:  Cristian Rolon MD    ESTIMATED BLOOD LOSS FOR THE PROCEDURE:  Less than 10 mL.    FINDINGS:  Moderate intrauterine contents with good uterine cry noted upon   completion of the procedure.    DESCRIPTION OF PROCEDURE:  The patient was taken to the operating room where   general anesthesia was performed without difficulty.  The patient was then   prepped and draped in usual sterile fashion.  Lower extremities placed in   Cuco stirrups.  Attention was first turned to the perineum where a weighted   speculum was placed with the use of Lee retractors.  Single toothed tenaculum   was placed on the anterior lip of the cervix.  Cervix was then dilated up to   10 mm.  A 10 curved curette was then placed.  Intrauterine cavity was   evacuated under direct suction curettage with good uterine cry noted upon   completion of the procedure.  Single toothed tenaculum and retractors were   then removed.  Patient tolerated the procedure well and was awoken from   general anesthesia, and was taken to recovery in stable condition.       ____________________________________     MD SUDARSHAN ZHENG / MAURISIO    DD:  2017 16:38:10  DT:  2017 17:03:16    D#:  [id number]  Job#:  [id number]

## 2017-08-02 NOTE — DISCHARGE INSTRUCTIONS
ACTIVITY: Rest and take it easy for the first 24 hours.  A responsible adult is recommended to remain with you during that time.  It is normal to feel sleepy.  We encourage you to not do anything that requires balance, judgment or coordination.    MILD FLU-LIKE SYMPTOMS ARE NORMAL. YOU MAY EXPERIENCE GENERALIZED MUSCLE ACHES, THROAT IRRITATION, HEADACHE AND/OR SOME NAUSEA.    FOR 24 HOURS DO NOT:  Drive, operate machinery or run household appliances.  Drink beer or alcoholic beverages.   Make important decisions or sign legal documents.    SPECIAL INSTRUCTIONS: *PLEASE SEE INSTRUCTION SHEET**    DIET: To avoid nausea, slowly advance diet as tolerated, avoiding spicy or greasy foods for the first day.  Add more substantial food to your diet according to your physician's instructions.  Babies can be fed formula or breast milk as soon as they are hungry.  INCREASE FLUIDS AND FIBER TO AVOID CONSTIPATION.    SURGICAL DRESSING/BATHING: *OK TO SHOWER.  NO TUB BATHS, HOT TUBS OR SOAKING**    FOLLOW-UP APPOINTMENT:  A follow-up appointment should be arranged with your doctor; call to schedule.    You should CALL YOUR PHYSICIAN if you develop:  Fever greater than 101 degrees F.  Pain not relieved by medication, or persistent nausea or vomiting.  Excessive bleeding (blood soaking through dressing) or unexpected drainage from the wound.  Extreme redness or swelling around the incision site, drainage of pus or foul smelling drainage.  Inability to urinate or empty your bladder within 8 hours.  Problems with breathing or chest pain.    You should call 911 if you develop problems with breathing or chest pain.  If you are unable to contact your doctor or surgical center, you should go to the nearest emergency room or urgent care center.    Physician's telephone #: *[phone]**    If any questions arise, call your doctor.  If your doctor is not available, please feel free to call the Surgical Center at [phone].  The Center is open  Monday through Friday from 7AM to 7PM.  You can also call the HEALTH HOTLINE open 24 hours/day, 7 days/week and speak to a nurse at [phone], or toll free at [phone].    A registered nurse may call you a few days after your surgery to see how you are doing after your procedure.    MEDICATIONS: Resume taking daily medication.  Take prescribed pain medication with food.  If no medication is prescribed, you may take non-aspirin pain medication if needed.  PAIN MEDICATION CAN BE VERY CONSTIPATING.  Take a stool softener or laxative such as senokot, pericolace, or milk of magnesia if needed.     Last pain medication given at *5:00 P.M. NEXT DOSE OF PAIN MEDICATION MAY BE GIVEN AT 9:00 P.M.**.    If your physician has prescribed pain medication that includes Acetaminophen (Tylenol), do not take additional Acetaminophen (Tylenol) while taking the prescribed medication.    Depression / Suicide Risk    As you are discharged from this [facility] facility, it is important to learn how to keep safe from harming yourself.    Recognize the warning signs:  · Abrupt changes in personality, positive or negative- including increase in energy   · Giving away possessions  · Change in eating patterns- significant weight changes-  positive or negative  · Change in sleeping patterns- unable to sleep or sleeping all the time   · Unwillingness or inability to communicate  · Depression  · Unusual sadness, discouragement and loneliness  · Talk of wanting to die  · Neglect of personal appearance   · Rebelliousness- reckless behavior  · Withdrawal from people/activities they love  · Confusion- inability to concentrate     If you or a loved one observes any of these behaviors or has concerns about self-harm, here's what you can do:  · Talk about it- your feelings and reasons for harming yourself  · Remove any means that you might use to hurt yourself (examples: pills, rope, extension cords, firearm)  · Get professional help from  the community (Mental Health, Substance Abuse, psychological counseling)  · Do not be alone:Call your Safe Contact- someone whom you trust who will be there for you.  · Call your local CRISIS HOTLINE 744-1420 or 067-008-6556  · Call your local Children's Mobile Crisis Response Team [place] (258) 493-4639 or www.[employer]  · Call the toll free National Suicide Prevention Hotlines   · National Suicide Prevention Lifeline 712-399-QWGH (9616)  · National Hope Line Network 800-SUICIDE (312-2655)

## 2019-01-25 ENCOUNTER — HOSPITAL ENCOUNTER (EMERGENCY)
Facility: MEDICAL CENTER | Age: 34
End: 2019-01-25
Attending: EMERGENCY MEDICINE
Payer: COMMERCIAL

## 2019-01-25 VITALS
BODY MASS INDEX: 22.52 KG/M2 | OXYGEN SATURATION: 98 % | DIASTOLIC BLOOD PRESSURE: 61 MMHG | SYSTOLIC BLOOD PRESSURE: 98 MMHG | TEMPERATURE: 98.6 F | RESPIRATION RATE: 15 BRPM | HEART RATE: 72 BPM | WEIGHT: 135.14 LBS | HEIGHT: 65 IN

## 2019-01-25 DIAGNOSIS — T78.40XA ALLERGIC REACTION, INITIAL ENCOUNTER: ICD-10-CM

## 2019-01-25 DIAGNOSIS — L50.9 URTICARIA: ICD-10-CM

## 2019-01-25 PROCEDURE — 700111 HCHG RX REV CODE 636 W/ 250 OVERRIDE (IP): Performed by: EMERGENCY MEDICINE

## 2019-01-25 PROCEDURE — 700102 HCHG RX REV CODE 250 W/ 637 OVERRIDE(OP): Performed by: EMERGENCY MEDICINE

## 2019-01-25 PROCEDURE — 99284 EMERGENCY DEPT VISIT MOD MDM: CPT

## 2019-01-25 PROCEDURE — A9270 NON-COVERED ITEM OR SERVICE: HCPCS | Performed by: EMERGENCY MEDICINE

## 2019-01-25 RX ORDER — FAMOTIDINE 20 MG/1
20 TABLET, FILM COATED ORAL ONCE
Status: COMPLETED | OUTPATIENT
Start: 2019-01-25 | End: 2019-01-25

## 2019-01-25 RX ORDER — DIPHENHYDRAMINE HCL 25 MG
25 TABLET ORAL ONCE
Status: COMPLETED | OUTPATIENT
Start: 2019-01-25 | End: 2019-01-25

## 2019-01-25 RX ORDER — PREDNISONE 20 MG/1
60 TABLET ORAL DAILY
Status: DISCONTINUED | OUTPATIENT
Start: 2019-01-25 | End: 2019-01-25 | Stop reason: HOSPADM

## 2019-01-25 RX ORDER — PREDNISONE 20 MG/1
60 TABLET ORAL DAILY
Qty: 12 TAB | Refills: 0 | Status: SHIPPED | OUTPATIENT
Start: 2019-01-25 | End: 2019-01-29

## 2019-01-25 RX ADMIN — DIPHENHYDRAMINE HCL 25 MG: 25 TABLET ORAL at 04:57

## 2019-01-25 RX ADMIN — PREDNISONE 60 MG: 20 TABLET ORAL at 04:57

## 2019-01-25 RX ADMIN — FAMOTIDINE 20 MG: 20 TABLET ORAL at 04:57

## 2019-01-25 NOTE — ED TRIAGE NOTES
Pt ambulatory to triage. Pt is French speaking, interpretor Lissy #[id number] utilized. Pt c/o itchy rash to back, abd, legs and hands since yesterday after eating dried fruit and walnuts. Pt took 50 mg benadryl at that time. Pt reports waking up this am with bilat eye puffiness. Hives noted to back, bilat legs and abd. Redness to bilat hands. Pt took 50 mg Benadryl at 0350. Denies itchiness to throat, no hives to face. Mild puffiness to bilat eyes. NAD.

## 2019-01-25 NOTE — DISCHARGE INSTRUCTIONS
You were seen in the Emergency Department for hives likely due to allergic reaction.    Please use 1,000mg of tylenol or 600mg of ibuprofen every 6 hours as needed for pain.  Take 25-50mg benadryl every 6 hours as needed for itching or swelling.  Take steroids as directed.    Please follow up with your primary care physician for allergist referral.    Return to the Emergency Department with worsening swelling, difficulty breathing, oral swelling, vomiting, or other concerns.

## 2019-01-25 NOTE — ED PROVIDER NOTES
"ED Provider Note    Scribed for Pippa Edwards M.D. by Deflino Allan. 1/25/2019  4:46 AM    Means of arrival: Walk In  History obtained from: Patient  History limited by: None      CHIEF COMPLAINT  Chief Complaint   Patient presents with   • Hives       HPI  Kait Talley is a 33 y.o. female otherwise healthy Lithuanian-speaking female who presents to the Emergency Department for evaluation of a diffuse pruritic rash which onset yesterday at 4:30, after eating dried fruit and nuts. She states she took 25 mg of Benadryl at 6 PM yesterday and 3 AM this morning. This morning Kait woke up with bilateral eye swelling, and noticed she had hives present to her back, legs, abdomen and hands. She denies any associated shortness of breath, oral swelling, nausea or vomiting.  No history of known allergies or prior allergic reactions.  Patient denies history of new soaps, detergents or lotions.    REVIEW OF SYSTEMS  Pertinent positive include hives. Pertinent negative include shortness of breath, oral swelling, nausea, vomiting.     PAST MEDICAL HISTORY  Patient denies any past medical problems or history.    SOCIAL HISTORY  Social History     Social History Main Topics   • Smoking status: Never Smoker   • Smokeless tobacco: Never Used   • Alcohol use No   • Drug use: No   • Sexual activity: Yes     Partners: Male     Birth control/ protection: Implant      Comment: Pt states had nexplanon removed 8/2016       SURGICAL HISTORY   has a past surgical history that includes dilation and evacuation (8/1/2017).    CURRENT MEDICATIONS  Patient does not take any medications    ALLERGIES  No Known Allergies    PHYSICAL EXAM   VITAL SIGNS: /73   Pulse 80   Temp 37 °C (98.6 °F) (Temporal)   Resp 16   Ht 1.651 m (5' 5\")   Wt 61.3 kg (135 lb 2.3 oz)   LMP 01/25/2019   SpO2 98%   BMI 22.49 kg/m²    Constitutional: Well-appearing  female.  Alert in no distress.  HENT: No oropharynx, tongue or lip swelling, " "Normocephalic, Atraumatic. Bilateral external ears normal. Nose normal.  Moist mucous membranes.  Oropharynx clear.  Eyes: Mild periorbital swelling, Pupils are equal and reactive. Conjunctiva normal.   Neck: Supple, full range of motion  Heart: Regular rate and rhythm.  No murmurs.  Lungs; Clear to auscultation bilaterally.  No wheezing or stridor  Abdomen: Soft, no distention.  No tenderness to palpation.  Skin: Warm, Dry.  No erythema. Diffuse urticarial rash covering chest, abdomen, and legs  Neurologic: Alert and oriented x3. Moving all extremities spontaneously without focal deficits.  Psychiatric: Affect normal, Mood normal, Appears appropriate and not intoxicated.      ED COURSE  Vitals:    01/25/19 0421 01/25/19 0426 01/25/19 0602   BP: 118/73  (!) 98/61   Pulse: 80  72   Resp: 16  15   Temp: 37 °C (98.6 °F)     TempSrc: Temporal     SpO2: 98%     Weight:  61.3 kg (135 lb 2.3 oz)    Height:  1.651 m (5' 5\")          Medications administered:  Medications   predniSONE (DELTASONE) tablet 60 mg (60 mg Oral Given 1/25/19 0457)   famotidine (PEPCID) tablet 20 mg (20 mg Oral Given 1/25/19 0457)   diphenhydrAMINE (BENADRYL) tablet/capsule 25 mg (25 mg Oral Given 1/25/19 0457)     =  4:46 AM Patient seen and examined at bedside. The patient presents with hives after eating dried fruit and nuts. Patient will be treated with Deltasone 60 mg, Pepcid 20 mg, Benadryl 25 mg for her symptoms. Informed her she will be treated with oral medications to help alleviate her symptoms and that I will continue to monitor her here in the ER.      MEDICAL DECISION MAKING  Otherwise healthy female presents with 2-day history of urticarial rash and concern for allergic reaction.  She is afebrile with normal vitals on arrival and nontoxic-appearing.  She has mild periorbital swelling however no other signs concerning for anaphylaxis.  She has no oral swelling or signs of respiratory compromise.  Patient will be treated with steroids, " Benadryl and famotidine followed by reassessment.    6:14 AM - Upon reassessment, patient is resting comfortably with normal vital signs.  No new complaints at this time.  She endorses improvement of her orbital swelling and rash.  Discussed results with patient and/or family as well as importance of primary care follow up.  She was instructed to avoid possible allergens as well as instructed on primary care and allergist follow-up.  Patient understands plan of care and strict return precautions for new or changing symptoms.       The patient will return for new or worsening symptoms and is stable at the time of discharge.        DISPOSITION:  Patient will be discharged home in stable condition.    FOLLOW UP:  Kaila Cueva, P.A.  [address]  [facility] [id number] [phone]    Schedule an appointment as soon as possible for a visit   FOR ALLERGIST REFERRAL    [facility] [facility], Emergency Dept  [address] [id number] [phone]    If symptoms worsen      OUTPATIENT MEDICATIONS:  Discharge Medication List as of 1/25/2019  5:57 AM      START taking these medications    Details   predniSONE (DELTASONE) 20 MG Tab Take 3 Tabs by mouth every day for 4 days., Disp-12 Tab, R-0, Print Rx Paper             IMPRESSION  (L50.9) Urticaria  (T78.40XA) Allergic reaction, initial encounter    Results, diagnoses, and treatment options were discussed with the patient and/or family. Patient verbalized understanding of plan of care.     Delfino TEJEDA ([employer]), am scribing for, and in the presence of, Pippa Edwards M.D..    Electronically signed by: Delfino Allan ([employer]), 1/25/2019    Pippa TEJEDA M.D. personally performed the services described in this documentation, as scribed by Delfino Allan in my presence, and it is both accurate and complete. E.    The note accurately reflects work and decisions made by me.  Pippa Edwards  1/25/2019  7:15 AM

## 2019-01-25 NOTE — ED NOTES
Discharge instructions given to patient, prescriptions provided, a verbal understanding of all instructions was stated. Pt preferred to walk out accompanied by family VSS,  all belongings accounted for.

## 2019-01-25 NOTE — ED NOTES
Pt states she is feeling better, hands are not as red. Family feels that her eyes are not as puffy.

## 2019-02-14 ENCOUNTER — HOSPITAL ENCOUNTER (OUTPATIENT)
Dept: LAB | Facility: MEDICAL CENTER | Age: 34
End: 2019-02-14
Attending: SPECIALIST
Payer: COMMERCIAL

## 2019-02-14 LAB — B-HCG SERPL-ACNC: 222.5 MIU/ML (ref 0–5)

## 2019-02-14 PROCEDURE — 36415 COLL VENOUS BLD VENIPUNCTURE: CPT

## 2019-02-14 PROCEDURE — 84702 CHORIONIC GONADOTROPIN TEST: CPT

## 2019-02-15 ENCOUNTER — OUTPATIENT INFUSION SERVICES (OUTPATIENT)
Dept: ONCOLOGY | Facility: MEDICAL CENTER | Age: 34
End: 2019-02-15
Attending: SPECIALIST
Payer: COMMERCIAL

## 2019-02-15 VITALS
TEMPERATURE: 99.1 F | HEIGHT: 63 IN | BODY MASS INDEX: 23.01 KG/M2 | RESPIRATION RATE: 18 BRPM | WEIGHT: 129.85 LBS | SYSTOLIC BLOOD PRESSURE: 102 MMHG | DIASTOLIC BLOOD PRESSURE: 62 MMHG | HEART RATE: 102 BPM | OXYGEN SATURATION: 95 %

## 2019-02-15 DIAGNOSIS — O20.0 THREATENED ABORTION: ICD-10-CM

## 2019-02-15 DIAGNOSIS — O00.101 RIGHT TUBAL PREGNANCY, UNSPECIFIED WHETHER INTRAUTERINE PREGNANCY PRESENT: ICD-10-CM

## 2019-02-15 LAB
ALBUMIN SERPL BCP-MCNC: 4.3 G/DL (ref 3.2–4.9)
ALBUMIN/GLOB SERPL: 1.3 G/DL
ALP SERPL-CCNC: 60 U/L (ref 30–99)
ALT SERPL-CCNC: 12 U/L (ref 2–50)
ANION GAP SERPL CALC-SCNC: 4 MMOL/L (ref 0–11.9)
AST SERPL-CCNC: 16 U/L (ref 12–45)
BILIRUB SERPL-MCNC: 0.6 MG/DL (ref 0.1–1.5)
BUN SERPL-MCNC: 16 MG/DL (ref 8–22)
CALCIUM SERPL-MCNC: 9.7 MG/DL (ref 8.5–10.5)
CHLORIDE SERPL-SCNC: 105 MMOL/L (ref 96–112)
CO2 SERPL-SCNC: 26 MMOL/L (ref 20–33)
CREAT SERPL-MCNC: 0.54 MG/DL (ref 0.5–1.4)
GLOBULIN SER CALC-MCNC: 3.2 G/DL (ref 1.9–3.5)
GLUCOSE SERPL-MCNC: 79 MG/DL (ref 65–99)
POTASSIUM SERPL-SCNC: 3.3 MMOL/L (ref 3.6–5.5)
PROT SERPL-MCNC: 7.5 G/DL (ref 6–8.2)
SODIUM SERPL-SCNC: 135 MMOL/L (ref 135–145)

## 2019-02-15 PROCEDURE — 96401 CHEMO ANTI-NEOPL SQ/IM: CPT

## 2019-02-15 PROCEDURE — 700111 HCHG RX REV CODE 636 W/ 250 OVERRIDE (IP): Mod: JW

## 2019-02-15 PROCEDURE — 700111 HCHG RX REV CODE 636 W/ 250 OVERRIDE (IP): Performed by: SPECIALIST

## 2019-02-15 PROCEDURE — 80053 COMPREHEN METABOLIC PANEL: CPT

## 2019-02-15 RX ADMIN — METHOTREXATE 40.5 MG: 25 SOLUTION INTRA-ARTERIAL; INTRAMUSCULAR; INTRATHECAL; INTRAVENOUS at 16:00

## 2019-02-15 RX ADMIN — METHOTREXATE 40.5 MG: 25 SOLUTION INTRA-ARTERIAL; INTRAMUSCULAR; INTRATHECAL; INTRAVENOUS at 15:56

## 2019-02-15 NOTE — PROGRESS NOTES
Pt is here for her scheduled Methotrexate injection.Lio shaver  - friend present to help with the communication. Questions answered. Pt has a f/u appointment scheduled in 10 days. CMP drawn and reviewed. K 3.3 - pt is to increase potassium rich foods in her diet; discussed. Methotrexate administered without an incident. Discharged home to self care.

## 2019-02-15 NOTE — PROGRESS NOTES
"Pharmacy Methotrexate for Ectopic Pregnancy Calculation Note       Wt 58.9 kg Ht 63 in  BSA ([place]) 1.62 m2    Pertinent Labs:  SCr 0.54 mg/dL  T. Bili 0.6, AST 16, ALT 12    Dx: Ectopic Pregnancy  Usual dose: Methotrexate 50 mg/m2 IM x 1 dose   Reference:   Jory LOVE, \"Clinical Practice. Ectopic Pregnancy,\" N Engl J Med, 2009, 361(4):379-87.      Calculations:  Methotrexate 50 mg/m2 x 1.62 m2 = 81 mg  Final Dose = 81 mg IM   Final Volume in syringe using Methotrexate 25 mg/mL injection = 3.24 mL    Split volumes > 3 ml into two syringes    Merritt Conte, PharmD      "

## 2019-02-15 NOTE — PROGRESS NOTES
Chemotherapy Verification - SECONDARY RN     D1C1    Height = 160 cm  Weight = 58.9 kg  BSA = 1.62 m2       Medication: Methotrexate  Dose: 50 mg/m2  Calculated Dose: 81 mg - divided into 2 syringes, 40.5 mg each                              I confirm that this process was performed independently.

## 2019-02-15 NOTE — PROGRESS NOTES
"Pharmacy Methotrexate for Ectopic Pregnancy Verification   Patient Name: Kait Simon  Dx: Ectopic Pregnancy  Methotrexate 50 mg/m2 IM x 1 dose   Jory LOVE, \"Clinical Practice. Ectopic Pregnancy,\" N Engl J Med, 2009, 361(4):379-87.    Allergies:Patient has no known allergies.  /62   Pulse (!) 102   Temp 37.3 °C (99.1 °F) (Temporal)   Resp 18   Ht 1.6 m (5' 2.99\")   Wt 58.9 kg (129 lb 13.6 oz)   LMP 01/25/2019   SpO2 95%   BMI 23.01 kg/m²   Body surface area is 1.62 meters squared.    Labs 2/15/19  SCr 0.54  T. Bili 0.6, AST 16, ALT 12    Methotrexate 50 mg/m2 x 1.62 m2 = 81 mg  Final Dose = 81 mg IM   Final Volume in syringe using Methotrexate 25 mg/mL injection = 3.24 mL (40.5 mg/1.62 mLs x 2 syringes)   (Split volumes > 3 ml into two syringes)    Deborah Nichole, PharmD, BCOP        "

## 2019-02-15 NOTE — PROGRESS NOTES
Chemotherapy Verification - PRIMARY RN      Height = 160cm  Weight = 58.9kg  BSA = 1.62m2       Medication: Methotrexate  Dose: 50mg/m2  Calculated Dose: 81mg                             (In mg/m2, AUC, mg/kg)           I confirm this process was performed independently with the BSA and all final chemotherapy dosing calculations congruent.  Any discrepancies of 5% or greater have been addressed with the chemotherapy pharmacist. The resolution of the discrepancy has been documented in the EPIC progress notes.

## 2019-02-19 ENCOUNTER — HOSPITAL ENCOUNTER (OUTPATIENT)
Dept: LAB | Facility: MEDICAL CENTER | Age: 34
End: 2019-02-19
Attending: SPECIALIST
Payer: COMMERCIAL

## 2019-02-19 LAB
ALBUMIN SERPL BCP-MCNC: 4 G/DL (ref 3.2–4.9)
ALBUMIN/GLOB SERPL: 1.4 G/DL
ALP SERPL-CCNC: 55 U/L (ref 30–99)
ALT SERPL-CCNC: 12 U/L (ref 2–50)
ANION GAP SERPL CALC-SCNC: 9 MMOL/L (ref 0–11.9)
AST SERPL-CCNC: 19 U/L (ref 12–45)
B-HCG SERPL-ACNC: 99.7 MIU/ML (ref 0–5)
BILIRUB SERPL-MCNC: 0.5 MG/DL (ref 0.1–1.5)
BUN SERPL-MCNC: 12 MG/DL (ref 8–22)
CALCIUM SERPL-MCNC: 9.1 MG/DL (ref 8.5–10.5)
CHLORIDE SERPL-SCNC: 103 MMOL/L (ref 96–112)
CO2 SERPL-SCNC: 24 MMOL/L (ref 20–33)
CREAT SERPL-MCNC: 0.55 MG/DL (ref 0.5–1.4)
ERYTHROCYTE [DISTWIDTH] IN BLOOD BY AUTOMATED COUNT: 45.3 FL (ref 35.9–50)
GLOBULIN SER CALC-MCNC: 2.9 G/DL (ref 1.9–3.5)
GLUCOSE SERPL-MCNC: 135 MG/DL (ref 65–99)
HCT VFR BLD AUTO: 36.1 % (ref 37–47)
HGB BLD-MCNC: 11.6 G/DL (ref 12–16)
MCH RBC QN AUTO: 31.2 PG (ref 27–33)
MCHC RBC AUTO-ENTMCNC: 32.1 G/DL (ref 33.6–35)
MCV RBC AUTO: 97 FL (ref 81.4–97.8)
PLATELET # BLD AUTO: 271 K/UL (ref 164–446)
PMV BLD AUTO: 10.5 FL (ref 9–12.9)
POTASSIUM SERPL-SCNC: 3.4 MMOL/L (ref 3.6–5.5)
PROT SERPL-MCNC: 6.9 G/DL (ref 6–8.2)
RBC # BLD AUTO: 3.72 M/UL (ref 4.2–5.4)
SODIUM SERPL-SCNC: 136 MMOL/L (ref 135–145)
WBC # BLD AUTO: 6.2 K/UL (ref 4.8–10.8)

## 2019-02-19 PROCEDURE — 85027 COMPLETE CBC AUTOMATED: CPT

## 2019-02-19 PROCEDURE — 36415 COLL VENOUS BLD VENIPUNCTURE: CPT

## 2019-02-19 PROCEDURE — 80053 COMPREHEN METABOLIC PANEL: CPT

## 2019-02-19 PROCEDURE — 84702 CHORIONIC GONADOTROPIN TEST: CPT

## 2019-02-25 ENCOUNTER — HOSPITAL ENCOUNTER (OUTPATIENT)
Dept: LAB | Facility: MEDICAL CENTER | Age: 34
End: 2019-02-25
Attending: SPECIALIST
Payer: COMMERCIAL

## 2019-02-25 LAB — B-HCG SERPL-ACNC: 42.7 MIU/ML (ref 0–5)

## 2019-02-25 PROCEDURE — 84702 CHORIONIC GONADOTROPIN TEST: CPT

## 2019-02-25 PROCEDURE — 36415 COLL VENOUS BLD VENIPUNCTURE: CPT

## 2019-02-27 ENCOUNTER — HOSPITAL ENCOUNTER (OUTPATIENT)
Dept: LAB | Facility: MEDICAL CENTER | Age: 34
End: 2019-02-27
Attending: SPECIALIST
Payer: COMMERCIAL

## 2019-02-27 LAB — B-HCG SERPL-ACNC: 24.1 MIU/ML (ref 0–5)

## 2019-02-27 PROCEDURE — 84702 CHORIONIC GONADOTROPIN TEST: CPT

## 2019-02-27 PROCEDURE — 36415 COLL VENOUS BLD VENIPUNCTURE: CPT

## 2019-03-01 ENCOUNTER — HOSPITAL ENCOUNTER (OUTPATIENT)
Dept: LAB | Facility: MEDICAL CENTER | Age: 34
End: 2019-03-01
Attending: SPECIALIST
Payer: COMMERCIAL

## 2019-03-01 LAB — B-HCG SERPL-ACNC: 18 MIU/ML (ref 0–5)

## 2019-03-01 PROCEDURE — 36415 COLL VENOUS BLD VENIPUNCTURE: CPT

## 2019-03-01 PROCEDURE — 84702 CHORIONIC GONADOTROPIN TEST: CPT

## 2019-03-12 ENCOUNTER — HOSPITAL ENCOUNTER (OUTPATIENT)
Dept: LAB | Facility: MEDICAL CENTER | Age: 34
End: 2019-03-12
Attending: SPECIALIST
Payer: COMMERCIAL

## 2019-03-12 LAB — B-HCG SERPL-ACNC: 1.4 MIU/ML (ref 0–5)

## 2019-03-12 PROCEDURE — 84702 CHORIONIC GONADOTROPIN TEST: CPT

## 2019-03-12 PROCEDURE — 36415 COLL VENOUS BLD VENIPUNCTURE: CPT

## 2020-03-09 ENCOUNTER — HOSPITAL ENCOUNTER (OUTPATIENT)
Dept: LAB | Facility: MEDICAL CENTER | Age: 35
End: 2020-03-09
Attending: SPECIALIST
Payer: COMMERCIAL

## 2020-03-09 LAB
ABO GROUP BLD: NORMAL
BASOPHILS # BLD AUTO: 0.2 % (ref 0–1.8)
BASOPHILS # BLD: 0.02 K/UL (ref 0–0.12)
BLD GP AB SCN SERPL QL: NORMAL
EOSINOPHIL # BLD AUTO: 0.1 K/UL (ref 0–0.51)
EOSINOPHIL NFR BLD: 1.2 % (ref 0–6.9)
ERYTHROCYTE [DISTWIDTH] IN BLOOD BY AUTOMATED COUNT: 45.7 FL (ref 35.9–50)
HBV SURFACE AG SER QL: NEGATIVE
HCT VFR BLD AUTO: 34.2 % (ref 37–47)
HGB BLD-MCNC: 11.7 G/DL (ref 12–16)
HIV 1+2 AB+HIV1 P24 AG SERPL QL IA: NON REACTIVE
IMM GRANULOCYTES # BLD AUTO: 0.02 K/UL (ref 0–0.11)
IMM GRANULOCYTES NFR BLD AUTO: 0.2 % (ref 0–0.9)
LYMPHOCYTES # BLD AUTO: 1.87 K/UL (ref 1–4.8)
LYMPHOCYTES NFR BLD: 22.3 % (ref 22–41)
MCH RBC QN AUTO: 32.2 PG (ref 27–33)
MCHC RBC AUTO-ENTMCNC: 34.2 G/DL (ref 33.6–35)
MCV RBC AUTO: 94.2 FL (ref 81.4–97.8)
MONOCYTES # BLD AUTO: 0.61 K/UL (ref 0–0.85)
MONOCYTES NFR BLD AUTO: 7.3 % (ref 0–13.4)
NEUTROPHILS # BLD AUTO: 5.75 K/UL (ref 2–7.15)
NEUTROPHILS NFR BLD: 68.8 % (ref 44–72)
NRBC # BLD AUTO: 0 K/UL
NRBC BLD-RTO: 0 /100 WBC
PLATELET # BLD AUTO: 257 K/UL (ref 164–446)
PMV BLD AUTO: 10.4 FL (ref 9–12.9)
RBC # BLD AUTO: 3.63 M/UL (ref 4.2–5.4)
RH BLD: NORMAL
RUBV AB SER QL: 49.2 IU/ML
TREPONEMA PALLIDUM IGG+IGM AB [PRESENCE] IN SERUM OR PLASMA BY IMMUNOASSAY: NON REACTIVE
WBC # BLD AUTO: 8.4 K/UL (ref 4.8–10.8)

## 2020-03-09 PROCEDURE — 87340 HEPATITIS B SURFACE AG IA: CPT

## 2020-03-09 PROCEDURE — 86780 TREPONEMA PALLIDUM: CPT

## 2020-03-09 PROCEDURE — 87389 HIV-1 AG W/HIV-1&-2 AB AG IA: CPT

## 2020-03-09 PROCEDURE — 86900 BLOOD TYPING SEROLOGIC ABO: CPT

## 2020-03-09 PROCEDURE — 86762 RUBELLA ANTIBODY: CPT

## 2020-03-09 PROCEDURE — 85025 COMPLETE CBC W/AUTO DIFF WBC: CPT

## 2020-03-09 PROCEDURE — 36415 COLL VENOUS BLD VENIPUNCTURE: CPT

## 2020-03-09 PROCEDURE — 86901 BLOOD TYPING SEROLOGIC RH(D): CPT

## 2020-03-09 PROCEDURE — 86850 RBC ANTIBODY SCREEN: CPT

## 2020-03-11 ENCOUNTER — APPOINTMENT (OUTPATIENT)
Dept: RADIOLOGY | Facility: MEDICAL CENTER | Age: 35
End: 2020-03-11
Attending: EMERGENCY MEDICINE
Payer: COMMERCIAL

## 2020-03-11 ENCOUNTER — HOSPITAL ENCOUNTER (EMERGENCY)
Facility: MEDICAL CENTER | Age: 35
End: 2020-03-11
Attending: EMERGENCY MEDICINE
Payer: COMMERCIAL

## 2020-03-11 VITALS
HEIGHT: 65 IN | DIASTOLIC BLOOD PRESSURE: 55 MMHG | BODY MASS INDEX: 22.99 KG/M2 | TEMPERATURE: 98.1 F | HEART RATE: 82 BPM | SYSTOLIC BLOOD PRESSURE: 96 MMHG | RESPIRATION RATE: 16 BRPM | WEIGHT: 138.01 LBS | OXYGEN SATURATION: 98 %

## 2020-03-11 DIAGNOSIS — O20.0 THREATENED MISCARRIAGE: ICD-10-CM

## 2020-03-11 LAB
ALBUMIN SERPL BCP-MCNC: 3.7 G/DL (ref 3.2–4.9)
ALBUMIN/GLOB SERPL: 1 G/DL
ALP SERPL-CCNC: 45 U/L (ref 30–99)
ALT SERPL-CCNC: 19 U/L (ref 2–50)
ANION GAP SERPL CALC-SCNC: 7 MMOL/L (ref 0–11.9)
APPEARANCE UR: ABNORMAL
AST SERPL-CCNC: 43 U/L (ref 12–45)
B-HCG SERPL-ACNC: ABNORMAL MIU/ML (ref 0–5)
BACTERIA #/AREA URNS HPF: NEGATIVE /HPF
BASOPHILS # BLD AUTO: 0.5 % (ref 0–1.8)
BASOPHILS # BLD: 0.04 K/UL (ref 0–0.12)
BILIRUB SERPL-MCNC: 0.4 MG/DL (ref 0.1–1.5)
BILIRUB UR QL STRIP.AUTO: NEGATIVE
BUN SERPL-MCNC: 9 MG/DL (ref 8–22)
CALCIUM SERPL-MCNC: 9.5 MG/DL (ref 8.5–10.5)
CHLORIDE SERPL-SCNC: 105 MMOL/L (ref 96–112)
CO2 SERPL-SCNC: 22 MMOL/L (ref 20–33)
COLOR UR: YELLOW
CREAT SERPL-MCNC: 0.46 MG/DL (ref 0.5–1.4)
EOSINOPHIL # BLD AUTO: 0.08 K/UL (ref 0–0.51)
EOSINOPHIL NFR BLD: 1.1 % (ref 0–6.9)
EPI CELLS #/AREA URNS HPF: NORMAL /HPF
ERYTHROCYTE [DISTWIDTH] IN BLOOD BY AUTOMATED COUNT: 46.5 FL (ref 35.9–50)
GLOBULIN SER CALC-MCNC: 3.8 G/DL (ref 1.9–3.5)
GLUCOSE SERPL-MCNC: 77 MG/DL (ref 65–99)
GLUCOSE UR STRIP.AUTO-MCNC: NEGATIVE MG/DL
HCT VFR BLD AUTO: 39.1 % (ref 37–47)
HGB BLD-MCNC: 12.8 G/DL (ref 12–16)
HYALINE CASTS #/AREA URNS LPF: NORMAL /LPF
IMM GRANULOCYTES # BLD AUTO: 0.03 K/UL (ref 0–0.11)
IMM GRANULOCYTES NFR BLD AUTO: 0.4 % (ref 0–0.9)
KETONES UR STRIP.AUTO-MCNC: NEGATIVE MG/DL
LEUKOCYTE ESTERASE UR QL STRIP.AUTO: NEGATIVE
LIPASE SERPL-CCNC: 10 U/L (ref 11–82)
LYMPHOCYTES # BLD AUTO: 1.6 K/UL (ref 1–4.8)
LYMPHOCYTES NFR BLD: 21.1 % (ref 22–41)
MCH RBC QN AUTO: 31.1 PG (ref 27–33)
MCHC RBC AUTO-ENTMCNC: 32.7 G/DL (ref 33.6–35)
MCV RBC AUTO: 95.1 FL (ref 81.4–97.8)
MICRO URNS: ABNORMAL
MONOCYTES # BLD AUTO: 0.5 K/UL (ref 0–0.85)
MONOCYTES NFR BLD AUTO: 6.6 % (ref 0–13.4)
NEUTROPHILS # BLD AUTO: 5.35 K/UL (ref 2–7.15)
NEUTROPHILS NFR BLD: 70.3 % (ref 44–72)
NITRITE UR QL STRIP.AUTO: NEGATIVE
NRBC # BLD AUTO: 0 K/UL
NRBC BLD-RTO: 0 /100 WBC
NUMBER OF RH DOSES IND 8505RD: 1
PH UR STRIP.AUTO: 8 [PH] (ref 5–8)
PLATELET # BLD AUTO: 274 K/UL (ref 164–446)
PMV BLD AUTO: 10.8 FL (ref 9–12.9)
POTASSIUM SERPL-SCNC: 4.7 MMOL/L (ref 3.6–5.5)
PROT SERPL-MCNC: 7.5 G/DL (ref 6–8.2)
PROT UR QL STRIP: NEGATIVE MG/DL
RBC # BLD AUTO: 4.11 M/UL (ref 4.2–5.4)
RBC # URNS HPF: NORMAL /HPF
RBC UR QL AUTO: ABNORMAL
RH BLD: NORMAL
SODIUM SERPL-SCNC: 134 MMOL/L (ref 135–145)
SP GR UR STRIP.AUTO: 1.01
UROBILINOGEN UR STRIP.AUTO-MCNC: 0.2 MG/DL
WBC # BLD AUTO: 7.6 K/UL (ref 4.8–10.8)
WBC #/AREA URNS HPF: NORMAL /HPF

## 2020-03-11 PROCEDURE — 36415 COLL VENOUS BLD VENIPUNCTURE: CPT

## 2020-03-11 PROCEDURE — 83690 ASSAY OF LIPASE: CPT

## 2020-03-11 PROCEDURE — 86901 BLOOD TYPING SEROLOGIC RH(D): CPT

## 2020-03-11 PROCEDURE — 85025 COMPLETE CBC W/AUTO DIFF WBC: CPT

## 2020-03-11 PROCEDURE — 84702 CHORIONIC GONADOTROPIN TEST: CPT

## 2020-03-11 PROCEDURE — 80053 COMPREHEN METABOLIC PANEL: CPT

## 2020-03-11 PROCEDURE — 76815 OB US LIMITED FETUS(S): CPT

## 2020-03-11 PROCEDURE — 81001 URINALYSIS AUTO W/SCOPE: CPT

## 2020-03-11 PROCEDURE — 99284 EMERGENCY DEPT VISIT MOD MDM: CPT

## 2020-03-11 ASSESSMENT — FIBROSIS 4 INDEX: FIB4 SCORE: 0.75

## 2020-03-11 NOTE — ED PROVIDER NOTES
ED Provider Note    CHIEF COMPLAINT  Chief Complaint   Patient presents with   • Pregnancy     15 weeks, previous US confirming IUP per pt   • Vaginal Bleeding     x30 minutes, with cramping. blood on toilet paper only. hx of 2 miscarriages.        HPI  Kait Talley is a 34 y.o. female who presents with vaginal bleeding.  The patient states that she has had light vaginal bleeding that started this morning.  She does have a history of 2 miscarriages and is approximately 15 weeks pregnant.  She has had an ultrasound that confirmed intrauterine implantation.  She does not have any significant discomfort.  She has had nausea and vomiting but no associated dizziness.  She denies difficulties with urination.    REVIEW OF SYSTEMS  See HPI for further details. All other systems are negative.     PAST MEDICAL HISTORY  No past medical history on file.    FAMILY HISTORY  [unfilled]    SOCIAL HISTORY  Social History     Socioeconomic History   • Marital status:      Spouse name: Not on file   • Number of children: Not on file   • Years of education: Not on file   • Highest education level: Not on file   Occupational History   • Not on file   Social Needs   • Financial resource strain: Not on file   • Food insecurity     Worry: Not on file     Inability: Not on file   • Transportation needs     Medical: Not on file     Non-medical: Not on file   Tobacco Use   • Smoking status: Never Smoker   • Smokeless tobacco: Never Used   Substance and Sexual Activity   • Alcohol use: No   • Drug use: No   • Sexual activity: Yes     Partners: Male     Birth control/protection: Implant     Comment: Pt states had nexplanon removed 8/2016   Lifestyle   • Physical activity     Days per week: Not on file     Minutes per session: Not on file   • Stress: Not on file   Relationships   • Social connections     Talks on phone: Not on file     Gets together: Not on file     Attends Judaism service: Not on file     Active member of club or  "organization: Not on file     Attends meetings of clubs or organizations: Not on file     Relationship status: Not on file   • Intimate partner violence     Fear of current or ex partner: Not on file     Emotionally abused: Not on file     Physically abused: Not on file     Forced sexual activity: Not on file   Other Topics Concern   • Not on file   Social History Narrative   • Not on file       SURGICAL HISTORY  Past Surgical History:   Procedure Laterality Date   • DILATION AND EVACUATION  8/1/2017    Procedure: DILATION AND EVACUATION;  Surgeon: David Phipps M.D.;  Location: SURGERY SAME DAY [facility];  Service:        CURRENT MEDICATIONS  Home Medications     Reviewed by Nicole Hernandez R.N. (Registered Nurse) on 03/11/20 at 0648  Med List Status: Complete   Medication Last Dose Status   Prenatal Vit-Fe Fumarate-FA (PRENATAL ONE DAILY PO)  Active                ALLERGIES  No Known Allergies    PHYSICAL EXAM  VITAL SIGNS: /76   Pulse 97   Temp 36.7 °C (98.1 °F) (Oral)   Resp 16   Ht 1.651 m (5' 5\")   Wt 62.6 kg (138 lb 0.1 oz)   LMP 11/22/2019   SpO2 98%   BMI 22.97 kg/m²       Constitutional: Well developed, Well nourished, No acute distress, Non-toxic appearance.   HENT: Normocephalic, Atraumatic, Bilateral external ears normal, Oropharynx moist, No oral exudates, Nose normal.   Eyes: PERRLA, EOMI, Conjunctiva normal, No discharge.   Neck: Normal range of motion, No tenderness, Supple, No stridor.   Lymphatic: No lymphadenopathy noted.   Cardiovascular: Normal heart rate, Normal rhythm, No murmurs, No rubs, No gallops.   Thorax & Lungs: Normal breath sounds, No respiratory distress, No wheezing, No chest tenderness.   Abdomen: Gravid, Soft, No tenderness, No masses, No pulsatile masses.   Skin: Warm, Dry, No erythema, No rash.   Back: No tenderness, No CVA tenderness.   Extremities: Intact distal pulses, No edema, No tenderness, No cyanosis, No clubbing.   Neurologic: Alert & oriented x " 3, Normal motor function, Normal sensory function, No focal deficits noted.   Psychiatric: Affect normal, Judgment normal, Mood normal.     RADIOLOGY/PROCEDURES  US-OB LIMITED TRANSABDOMINAL   Final Result      1.  Single intrauterine pregnancy of an estimated gestational age of 16 weeks, 1 day with an estimated date of delivery of 8/25/2020.      2.  No placental abruption is appreciated.               COURSE & MEDICAL DECISION MAKING  Pertinent Labs & Imaging studies reviewed. (See chart for details)  This is a 34-year-old female who presents to the emergency department with a threatened miscarriage.  The ultrasound does confirm intrauterine pregnancy was 16 weeks and 1 day.  She states the bleeding was very minimal.  She does not have any urinary symptoms and her urine does appear contaminated.  The patient is hemodynamically stable otherwise.  She will be discharged with instructions for pelvic rest and to drink lots of fluids.  She will return for increased bleeding or pain.  She has a follow-up appointment with her obstetrician on the 31st and she will keep this appointment.    FINAL IMPRESSION  1.  Threatened miscarriage    Disposition  The patient will be discharged in stable condition         Electronically signed by: Luis Armando Figueredo M.D., 3/11/2020 8:06 AM

## 2020-03-11 NOTE — ED TRIAGE NOTES
"Chief Complaint   Patient presents with   • Pregnancy     15 weeks, previous US confirming IUP per pt   • Vaginal Bleeding     x30 minutes, with cramping. blood on toilet paper only. hx of 2 miscarriages.       Pt presents to ed following vaginal bleeding and cramping that started 30 minutes ago. Pt is 15 weeks pregnant and reports prior US confirming.  Pt has hx of miscarriage x2.  Pt denies dizziness at this time.     Pt placed back in lobby.  Informed of triage process and wait times, thanked for patience.  Pt instructed to notify staff of any symptom changes.    /76   Pulse 97   Temp 36.7 °C (98.1 °F) (Oral)   Resp 16   Ht 1.651 m (5' 5\")   Wt 62.6 kg (138 lb 0.1 oz)   LMP 11/22/2019   SpO2 98%   BMI 22.97 kg/m²    "

## 2020-03-27 ENCOUNTER — HOSPITAL ENCOUNTER (OUTPATIENT)
Dept: LAB | Facility: MEDICAL CENTER | Age: 35
End: 2020-03-27
Attending: SPECIALIST
Payer: COMMERCIAL

## 2020-03-27 PROCEDURE — 36415 COLL VENOUS BLD VENIPUNCTURE: CPT

## 2020-03-27 PROCEDURE — 82105 ALPHA-FETOPROTEIN SERUM: CPT

## 2020-03-29 LAB
# FETUSES US: NORMAL
AFP MOM SERPL: 0.66
AFP SERPL-MCNC: 30 NG/ML
AGE - REPORTED: 36.2 YR
CURRENT SMOKER: NO
FAMILY MEMBER DISEASES HX: NO
GA METHOD: NORMAL
GA: NORMAL WK
IDDM PATIENT QL: NO
INTEGRATED SCN PATIENT-IMP: NORMAL
SPECIMEN DRAWN SERPL: NORMAL

## 2020-05-21 ENCOUNTER — HOSPITAL ENCOUNTER (OUTPATIENT)
Dept: LAB | Facility: MEDICAL CENTER | Age: 35
End: 2020-05-21
Attending: SPECIALIST
Payer: COMMERCIAL

## 2020-05-21 LAB
GLUCOSE 1H P 50 G GLC PO SERPL-MCNC: 106 MG/DL (ref 70–139)
HCT VFR BLD AUTO: 34.2 % (ref 37–47)
HGB BLD-MCNC: 10.8 G/DL (ref 12–16)

## 2020-05-21 PROCEDURE — 36415 COLL VENOUS BLD VENIPUNCTURE: CPT

## 2020-05-21 PROCEDURE — 82950 GLUCOSE TEST: CPT

## 2020-05-21 PROCEDURE — 85014 HEMATOCRIT: CPT

## 2020-05-21 PROCEDURE — 85018 HEMOGLOBIN: CPT

## 2020-06-09 ENCOUNTER — HOSPITAL ENCOUNTER (EMERGENCY)
Facility: MEDICAL CENTER | Age: 35
End: 2020-06-09
Payer: COMMERCIAL

## 2020-06-09 ENCOUNTER — HOSPITAL ENCOUNTER (OUTPATIENT)
Facility: MEDICAL CENTER | Age: 35
End: 2020-06-09
Attending: SPECIALIST | Admitting: SPECIALIST
Payer: COMMERCIAL

## 2020-06-09 VITALS
BODY MASS INDEX: 26.75 KG/M2 | RESPIRATION RATE: 16 BRPM | SYSTOLIC BLOOD PRESSURE: 104 MMHG | OXYGEN SATURATION: 97 % | WEIGHT: 151 LBS | HEIGHT: 63 IN | DIASTOLIC BLOOD PRESSURE: 67 MMHG | TEMPERATURE: 97.9 F | HEART RATE: 92 BPM

## 2020-06-09 VITALS
BODY MASS INDEX: 26.75 KG/M2 | HEART RATE: 92 BPM | WEIGHT: 151 LBS | HEIGHT: 63 IN | DIASTOLIC BLOOD PRESSURE: 69 MMHG | OXYGEN SATURATION: 99 % | TEMPERATURE: 97.1 F | SYSTOLIC BLOOD PRESSURE: 107 MMHG | RESPIRATION RATE: 18 BRPM

## 2020-06-09 PROCEDURE — 59025 FETAL NON-STRESS TEST: CPT

## 2020-06-09 ASSESSMENT — PAIN SCALES - GENERAL: PAINLEVEL: 0 - NO PAIN

## 2020-06-09 ASSESSMENT — FIBROSIS 4 INDEX
FIB4 SCORE: 1.26
FIB4 SCORE: 1.26

## 2020-06-09 NOTE — PROGRESS NOTES
0045- pt is a , 28.4 weeks gestation IUP, with c/o nausea and increased fetal movement after taking her Rx iron for the first time this evening. No c/o lof, bleeding, uc's, or dfm. efm and toco placed, vss. Teaching performed on common options to prevent nausea with medications, and increase fetal movement at night is common and with growth.  Pt verbalized relief.  0114- dr. chacon called and given report, received orders for discharge home.  0138- addressed discharge instructions, all questions answered, left off floor stable with family at side.

## 2020-06-09 NOTE — DISCHARGE INSTRUCTIONS
General Instructions:  · If you think you are in labor, time contractions (lying on your left side) from the beginning of one contraction to the beginning of the next contraction for at least one hour.  · Increase fluid intake: you should consume 10-12 8 oz glasses of non-caffeinated fluid per day.  · Report any pressure or burning on urination to your physician.  · Monitor fetal movement: If you notice an absence or decrease in fetal movement, drink a large glass of water and rest on your side.  If there is no increase in movement, call your physician or go to the hospital for further evaluation.  · Report any sudden, sharp abdominal pain.  · Report any bleeding.  Spotting or pinkish discharge is normal after vaginal exam.  You may also spot after sexual intercourse.    Pre-term Labor (<37 weeks):  Call your physician or return to the hospital if:  · You have painless regular contractions more than 4 in one hour.  · Your water breaks (remember time and color).  · You have menstrual-like cramps, a low dull backache or pressure in your pelvis or back.  · Your baby does not move enough to complete the daily kick count (10 movements in 2 hours).  · Your baby moves much less often than on the days before or you have not felt your baby move all day.  · Please review the MEDICATION LIST section of your AFTER VISIT SUMMARY document.  · Take your medication as prescribed      Other Instructions:  Please carefully review your entire AFTER VISIT SUMMARY document for all discharge instructions.    Evaluación de los movimientos fetales   (Fetal Movement Counts)  Nombre del paciente: __________________________________________________ Fecha de parto estimada: ____________________  La evaluación de los movimientos fetales es muy recomendable en los embarazos de alto riesgo, quang también es julita buena idea que lo twin todas las embarazadas. El médico le indicará que comience a contarlos a las 28 semanas de embarazo. Los movimientos  fetales suelen aumentar:   · Después de julita comida completa.  · Después de la actividad física.  · Después de comer o beber algo randy o frío.  · En reposo.  Preste atención cuando sienta que el bebé está más activo. [place] le ayudará a notar un patrón de ciclos de vigilia y sueño de chadwick bebé y cuáles son los factores que contribuyen a un aumento de los movimientos fetales. Es importante llevar a cabo un recuento de movimientos fetales, al mismo tiempo cada día, cuando el bebé normalmente está más activo.   CÓMO CONTAR LOS MOVIMIENTOS FETALES  7. Busque un lugar tranquilo y cómodo para sentarse o recostarse sobre el lado sharon. Al recostarse sobre chadwick lado sharon, le proporciona julita mejor circulación de gabriele y oxígeno al bebé.  8. Anote el día y la hora en julita hoja de papel o en un diario.  9. Comience contando las pataditas, revoloteos, chasquidos, vueltas o pinchazos en un período de 2 horas. Debe sentir al menos 10 movimientos en 2 horas.  10. Si no siente 10 movimientos en 2 horas, espere 2 ó 3 horas y cuente de nuevo. Busque cambios en el patrón o si no cuenta lo suficiente en 2 horas.  SOLICITE ATENCIÓN MÉDICA SI:   · Siente menos de 10 pataditas en 2 horas, en dos intentos.  · No hay movimientos radha julita hora.  · El patrón se modifica o le lleva más tiempo cada día contar las 10 pataditas.  · Siente que el bebé no se mueve sree lo hace habitualmente.  Fecha: ____________ Movimientos: ____________ Hora de inicio: ____________ Hora de finalización: ____________   Fecha: ____________ Movimientos: ____________ Hora de inicio: ____________ Hora de finalización: ____________   Fecha: ____________ Movimientos: ____________ Hora de inicio: ____________ Hora de finalización: ____________   Fecha: ____________ Movimientos: ____________ Hora de inicio: ____________ Hora de finalización: ____________   Fecha: ____________ Movimientos: ____________ Hora de inicio: ____________ Hora de finalización: ____________    Fecha: ____________ Movimientos: ____________ Hora de inicio: ____________ Hora de finalización: ____________   Fecha: ____________ Movimientos: ____________ Hora de inicio: ____________ Hora de finalización: ____________   Fecha: ____________ Movimientos: ____________ Hora de inicio: ____________ Hora de finalización: ____________   Fecha: ____________ Movimientos: ____________ Hora de inicio: ____________ Hora de finalización: ____________   Fecha: ____________ Movimientos: ____________ Hora de inicio: ____________ Hora de finalización: ____________   Fecha: ____________ Movimientos: ____________ Hora de inicio: ____________ Hora de finalización: ____________   Fecha: ____________ Movimientos: ____________ Hora de inicio: ____________ Hora de finalización: ____________   Fecha: ____________ Movimientos: ____________ Hora de inicio: ____________ Hora de finalización: ____________   Fecha: ____________ Movimientos: ____________ Hora de inicio: ____________ Hora de finalización: ____________   Fecha: ____________ Movimientos: ____________ Hora de inicio: ____________ Hora de finalización: ____________   Fecha: ____________ Movimientos: ____________ Hora de inicio: ____________ Hora de finalización: ____________   Fecha: ____________ Movimientos: ____________ Hora de inicio: ____________ Hora de finalización: ____________   Fecha: ____________ Movimientos: ____________ Hora de inicio: ____________ Hora de finalización: ____________   Fecha: ____________ Movimientos: ____________ Hora de inicio: ____________ Hora de finalización: ____________   Fecha: ____________ Movimientos: ____________ Hora de inicio: ____________ Hora de finalización: ____________   Fecha: ____________ Movimientos: ____________ Hora de inicio: ____________ Hora de finalización: ____________   Fecha: ____________ Movimientos: ____________ Hora de inicio: ____________ Hora de finalización: ____________   Fecha: ____________ Movimientos:  ____________ Hora de inicio: ____________ Hora de finalización: ____________   Fecha: ____________ Movimientos: ____________ Hora de inicio: ____________ Hora de finalización: ____________   Fecha: ____________ Movimientos: ____________ Hora de inicio: ____________ Hora de finalización: ____________   Fecha: ____________ Movimientos: ____________ Hora de inicio: ____________ Hora de finalización: ____________   Fecha: ____________ Movimientos: ____________ Hora de inicio: ____________ Hora de finalización: ____________   Fecha: ____________ Movimientos: ____________ Hora de inicio: ____________ Hora de finalización: ____________   Fecha: ____________ Movimientos: ____________ Hora de inicio: ____________ Hora de finalización: ____________   Fecha: ____________ Movimientos: ____________ Hora de inicio: ____________ Hora de finalización: ____________   Fecha: ____________ Movimientos: ____________ Hora de inicio: ____________ Hora de finalización: ____________   Fecha: ____________ Movimientos: ____________ Hora de inicio: ____________ Hora de finalización: ____________   Fecha: ____________ Movimientos: ____________ Hora de inicio: ____________ Hora de finalización: ____________   Fecha: ____________ Movimientos: ____________ Hora de inicio: ____________ Hora de finalización: ____________   Fecha: ____________ Movimientos: ____________ Hora de inicio: ____________ Hora de finalización: ____________   Fecha: ____________ Movimientos: ____________ Hora de inicio: ____________ Hora de finalización: ____________   Fecha: ____________ Movimientos: ____________ Hora de inicio: ____________ Hora de finalización: ____________   Fecha: ____________ Movimientos: ____________ Hora de inicio: ____________ Hora de finalización: ____________   Fecha: ____________ Movimientos: ____________ Hora de inicio: ____________ Hora de finalización: ____________   Fecha: ____________ Movimientos: ____________ Hora de inicio: ____________  Hora de finalización: ____________   Fecha: ____________ Movimientos: ____________ Hora de inicio: ____________ Hora de finalización: ____________   Fecha: ____________ Movimientos: ____________ Hora de inicio: ____________ Hora de finalización: ____________   Fecha: ____________ Movimientos: ____________ Hora de inicio: ____________ Hora de finalización: ____________   Fecha: ____________ Movimientos: ____________ Hora de inicio: ____________ Hora de finalización: ____________   Fecha: ____________ Movimientos: ____________ Hora de inicio: ____________ Hora de finalización: ____________   Fecha: ____________ Movimientos: ____________ Hora de inicio: ____________ Hora de finalización: ____________   Fecha: ____________ Movimientos: ____________ Hora de inicio: ____________ Hora de finalización: ____________   Fecha: ____________ Movimientos: ____________ Hora de inicio: ____________ Hora de finalización: ____________   Fecha: ____________ Movimientos: ____________ Hora de inicio: ____________ Hora de finalización: ____________   Fecha: ____________ Movimientos: ____________ Hora de inicio: ____________ Hora de finalización: ____________   Fecha: ____________ Movimientos: ____________ Hora de inicio: ____________ Hora de finalización: ____________   Fecha: ____________ Movimientos: ____________ Hora de inicio: ____________ Hora de finalización: ____________   Fecha: ____________ Movimientos: ____________ Hora de inicio: ____________ Hora de finalización: ____________   Fecha: ____________ Movimientos: ____________ Hora de inicio: ____________ Hora de finalización: ____________   Fecha: ____________ Movimientos: ____________ Hora de inicio: ____________ Hora de finalización: ____________   Fecha: ____________ Movimientos: ____________ Hora de inicio: ____________ Hora de finalización: ____________   Esta información no tiene sree fin reemplazar el consejo del médico. Asegúrese de hacerle al médico cualquier pregunta  kristi gaines.  Document Released: 03/26/2009 Document Revised: 12/04/2013  Elsevier Interactive Patient Education © 2017 [employer]

## 2020-07-05 ENCOUNTER — APPOINTMENT (OUTPATIENT)
Dept: RADIOLOGY | Facility: MEDICAL CENTER | Age: 35
End: 2020-07-05
Attending: SPECIALIST
Payer: MEDICAID

## 2020-07-05 ENCOUNTER — HOSPITAL ENCOUNTER (OUTPATIENT)
Facility: MEDICAL CENTER | Age: 35
End: 2020-07-05
Attending: SPECIALIST | Admitting: SPECIALIST
Payer: COMMERCIAL

## 2020-07-05 VITALS
HEIGHT: 63 IN | OXYGEN SATURATION: 95 % | BODY MASS INDEX: 26.75 KG/M2 | SYSTOLIC BLOOD PRESSURE: 92 MMHG | HEART RATE: 93 BPM | DIASTOLIC BLOOD PRESSURE: 57 MMHG | WEIGHT: 151 LBS | RESPIRATION RATE: 16 BRPM | TEMPERATURE: 97.8 F

## 2020-07-05 LAB — FIBRONECTIN FETAL SPEC QL: POSITIVE

## 2020-07-05 PROCEDURE — 82731 ASSAY OF FETAL FIBRONECTIN: CPT

## 2020-07-05 PROCEDURE — 76817 TRANSVAGINAL US OBSTETRIC: CPT

## 2020-07-05 PROCEDURE — 59025 FETAL NON-STRESS TEST: CPT

## 2020-07-05 PROCEDURE — 700111 HCHG RX REV CODE 636 W/ 250 OVERRIDE (IP): Performed by: SPECIALIST

## 2020-07-05 RX ORDER — BETAMETHASONE SODIUM PHOSPHATE AND BETAMETHASONE ACETATE 3; 3 MG/ML; MG/ML
12 INJECTION, SUSPENSION INTRA-ARTICULAR; INTRALESIONAL; INTRAMUSCULAR; SOFT TISSUE EVERY 24 HOURS
Status: DISCONTINUED | OUTPATIENT
Start: 2020-07-05 | End: 2020-07-05 | Stop reason: HOSPADM

## 2020-07-05 RX ADMIN — BETAMETHASONE SODIUM PHOSPHATE AND BETAMETHASONE ACETATE 12 MG: 3; 3 INJECTION, SUSPENSION INTRA-ARTICULAR; INTRALESIONAL; INTRAMUSCULAR at 18:34

## 2020-07-05 ASSESSMENT — PAIN SCALES - GENERAL: PAINLEVEL: 0 - NO PAIN

## 2020-07-05 ASSESSMENT — FIBROSIS 4 INDEX: FIB4 SCORE: 1.3

## 2020-07-05 NOTE — PROGRESS NOTES
1640- Report from Mary NICOLE. FFN collected and sent ot lab. SVE done by JASON Santana and CHAD hernandez. Informed patient about appointment tomorrow.    FFN results positive.    1750- Dr. Phipps notified of FFN results and SVE. Orders for betamethasone and US for cervical length.     0- US Cervical length 4.27cm. Patient to be discharged home with daughter. Discharge instructions reviewed. Patient verbalizes understanding.  labor cautions reviewed and when to come back to hospital.  Betamethasone given, educated to come back at 1800 tomorrow for second dose.   Patient to be seen in office with Dr. Phipps at 0900 tomorrow morning.    1840- Discharged in stable condition ambulating off unit with daughter.

## 2020-07-05 NOTE — PROGRESS NOTES
36 y/o , EDC , EGA 32.2. Pt here for dark vaginal discharge that she had. Pt states +FM, denies vaginal LOF or bleeding. UCs picked up on monitor, palpates mild to moderate. Pt not feeling UC at all.   1615 - Dr. Phipps updated on pt. Orders received to collect an FFN, do a cervical check, and make sure pt has a scheduled appointment for tomorrow.   1640 - Report given to JASON Santana RN and Flakita NICOLE.

## 2020-07-06 ENCOUNTER — HOSPITAL ENCOUNTER (OUTPATIENT)
Facility: MEDICAL CENTER | Age: 35
End: 2020-07-06
Attending: SPECIALIST | Admitting: SPECIALIST
Payer: MEDICAID

## 2020-07-06 ENCOUNTER — APPOINTMENT (OUTPATIENT)
Dept: OBGYN | Facility: MEDICAL CENTER | Age: 35
End: 2020-07-06
Attending: SPECIALIST
Payer: MEDICAID

## 2020-07-06 ENCOUNTER — APPOINTMENT (OUTPATIENT)
Dept: RADIOLOGY | Facility: MEDICAL CENTER | Age: 35
End: 2020-07-06
Attending: SPECIALIST
Payer: MEDICAID

## 2020-07-06 VITALS
HEART RATE: 101 BPM | SYSTOLIC BLOOD PRESSURE: 98 MMHG | BODY MASS INDEX: 27.11 KG/M2 | DIASTOLIC BLOOD PRESSURE: 54 MMHG | RESPIRATION RATE: 16 BRPM | HEIGHT: 63 IN | OXYGEN SATURATION: 98 % | TEMPERATURE: 99.5 F | WEIGHT: 153 LBS

## 2020-07-06 LAB
APPEARANCE UR: CLEAR
COLOR UR AUTO: YELLOW
GLUCOSE UR QL STRIP.AUTO: NEGATIVE MG/DL
KETONES UR QL STRIP.AUTO: 15 MG/DL
LEUKOCYTE ESTERASE UR QL STRIP.AUTO: NEGATIVE
NITRITE UR QL STRIP.AUTO: NEGATIVE
PH UR STRIP.AUTO: 6 [PH] (ref 5–8)
PROT UR QL STRIP: NEGATIVE MG/DL
RBC UR QL AUTO: ABNORMAL
SP GR UR STRIP.AUTO: 1.02 (ref 1–1.03)

## 2020-07-06 PROCEDURE — 96372 THER/PROPH/DIAG INJ SC/IM: CPT

## 2020-07-06 PROCEDURE — 700111 HCHG RX REV CODE 636 W/ 250 OVERRIDE (IP): Performed by: SPECIALIST

## 2020-07-06 PROCEDURE — 76819 FETAL BIOPHYS PROFIL W/O NST: CPT

## 2020-07-06 PROCEDURE — 59025 FETAL NON-STRESS TEST: CPT

## 2020-07-06 PROCEDURE — 81002 URINALYSIS NONAUTO W/O SCOPE: CPT

## 2020-07-06 RX ORDER — BETAMETHASONE SODIUM PHOSPHATE AND BETAMETHASONE ACETATE 3; 3 MG/ML; MG/ML
12 INJECTION, SUSPENSION INTRA-ARTICULAR; INTRALESIONAL; INTRAMUSCULAR; SOFT TISSUE ONCE
Status: COMPLETED | OUTPATIENT
Start: 2020-07-06 | End: 2020-07-06

## 2020-07-06 RX ADMIN — BETAMETHASONE SODIUM PHOSPHATE AND BETAMETHASONE ACETATE 12 MG: 3; 3 INJECTION, SUSPENSION INTRA-ARTICULAR; INTRALESIONAL; INTRAMUSCULAR at 18:55

## 2020-07-06 ASSESSMENT — FIBROSIS 4 INDEX: FIB4 SCORE: 1.3

## 2020-07-06 ASSESSMENT — PAIN SCALES - GENERAL: PAINLEVEL: 0 - NO PAIN

## 2020-07-07 NOTE — PROGRESS NOTES
1845 report received from VICKY Downs RN, betamethasone administered.   1923 US at bedside  1935 Dr Chacon called and orders to monitor for 2 hours  2150 discharge orders provided and when to return to labor and delivery.   2155 patient off floor stable on feet

## 2020-07-07 NOTE — PROGRESS NOTES
Patient comes in for scheduled betamethasone.  She denies feeling contractions.  Reports brown discharge.  She feels fetal movement.  Dr Chacon called order for betamethasone placed.  Deceleration noted at 1835.  Dr Chacon called.  BPP ordered.  Report given to Manolo NICOLE>

## 2020-08-05 ENCOUNTER — HOSPITAL ENCOUNTER (INPATIENT)
Facility: MEDICAL CENTER | Age: 35
LOS: 2 days | End: 2020-08-08
Attending: SPECIALIST | Admitting: SPECIALIST
Payer: MEDICAID

## 2020-08-05 ASSESSMENT — FIBROSIS 4 INDEX: FIB4 SCORE: 1.3

## 2020-08-06 ENCOUNTER — APPOINTMENT (OUTPATIENT)
Dept: RADIOLOGY | Facility: MEDICAL CENTER | Age: 35
End: 2020-08-06
Attending: SPECIALIST
Payer: MEDICAID

## 2020-08-06 LAB
A1 MICROGLOB PLACENTAL VAG QL: POSITIVE
ALBUMIN SERPL BCP-MCNC: 3.2 G/DL (ref 3.2–4.9)
ALBUMIN/GLOB SERPL: 0.9 G/DL
ALP SERPL-CCNC: 208 U/L (ref 30–99)
ALT SERPL-CCNC: 17 U/L (ref 2–50)
ANION GAP SERPL CALC-SCNC: 15 MMOL/L (ref 7–16)
AST SERPL-CCNC: 18 U/L (ref 12–45)
BASOPHILS # BLD AUTO: 0.3 % (ref 0–1.8)
BASOPHILS # BLD: 0.03 K/UL (ref 0–0.12)
BILIRUB SERPL-MCNC: 0.4 MG/DL (ref 0.1–1.5)
BUN SERPL-MCNC: 9 MG/DL (ref 8–22)
CALCIUM SERPL-MCNC: 9.1 MG/DL (ref 8.5–10.5)
CHLORIDE SERPL-SCNC: 101 MMOL/L (ref 96–112)
CO2 SERPL-SCNC: 19 MMOL/L (ref 20–33)
COVID ORDER STATUS COVID19: NORMAL
CREAT SERPL-MCNC: 0.33 MG/DL (ref 0.5–1.4)
EOSINOPHIL # BLD AUTO: 0.11 K/UL (ref 0–0.51)
EOSINOPHIL NFR BLD: 1.1 % (ref 0–6.9)
ERYTHROCYTE [DISTWIDTH] IN BLOOD BY AUTOMATED COUNT: 51.1 FL (ref 35.9–50)
ERYTHROCYTE [DISTWIDTH] IN BLOOD BY AUTOMATED COUNT: 51.9 FL (ref 35.9–50)
GLOBULIN SER CALC-MCNC: 3.5 G/DL (ref 1.9–3.5)
GLUCOSE SERPL-MCNC: 78 MG/DL (ref 65–99)
GP B STREP DNA SPEC QL NAA+PROBE: POSITIVE
HCT VFR BLD AUTO: 37.8 % (ref 37–47)
HCT VFR BLD AUTO: 40.3 % (ref 37–47)
HGB BLD-MCNC: 12.4 G/DL (ref 12–16)
HGB BLD-MCNC: 13.5 G/DL (ref 12–16)
HOLDING TUBE BB 8507: NORMAL
IMM GRANULOCYTES # BLD AUTO: 0.05 K/UL (ref 0–0.11)
IMM GRANULOCYTES NFR BLD AUTO: 0.5 % (ref 0–0.9)
LYMPHOCYTES # BLD AUTO: 2.16 K/UL (ref 1–4.8)
LYMPHOCYTES NFR BLD: 22.3 % (ref 22–41)
MCH RBC QN AUTO: 31.8 PG (ref 27–33)
MCH RBC QN AUTO: 32.1 PG (ref 27–33)
MCHC RBC AUTO-ENTMCNC: 32.8 G/DL (ref 33.6–35)
MCHC RBC AUTO-ENTMCNC: 33.5 G/DL (ref 33.6–35)
MCV RBC AUTO: 94.8 FL (ref 81.4–97.8)
MCV RBC AUTO: 97.9 FL (ref 81.4–97.8)
MONOCYTES # BLD AUTO: 0.96 K/UL (ref 0–0.85)
MONOCYTES NFR BLD AUTO: 9.9 % (ref 0–13.4)
NEUTROPHILS # BLD AUTO: 6.38 K/UL (ref 2–7.15)
NEUTROPHILS NFR BLD: 65.9 % (ref 44–72)
NRBC # BLD AUTO: 0 K/UL
NRBC BLD-RTO: 0 /100 WBC
PLATELET # BLD AUTO: 203 K/UL (ref 164–446)
PLATELET # BLD AUTO: 213 K/UL (ref 164–446)
PMV BLD AUTO: 10.3 FL (ref 9–12.9)
PMV BLD AUTO: 10.5 FL (ref 9–12.9)
POTASSIUM SERPL-SCNC: 3.8 MMOL/L (ref 3.6–5.5)
PROT SERPL-MCNC: 6.7 G/DL (ref 6–8.2)
RBC # BLD AUTO: 3.86 M/UL (ref 4.2–5.4)
RBC # BLD AUTO: 4.25 M/UL (ref 4.2–5.4)
SARS-COV-2 RDRP RESP QL NAA+PROBE: NOTDETECTED
SODIUM SERPL-SCNC: 135 MMOL/L (ref 135–145)
SPECIMEN SOURCE: NORMAL
WBC # BLD AUTO: 12.9 K/UL (ref 4.8–10.8)
WBC # BLD AUTO: 9.7 K/UL (ref 4.8–10.8)

## 2020-08-06 PROCEDURE — 700111 HCHG RX REV CODE 636 W/ 250 OVERRIDE (IP)

## 2020-08-06 PROCEDURE — 700111 HCHG RX REV CODE 636 W/ 250 OVERRIDE (IP): Performed by: SPECIALIST

## 2020-08-06 PROCEDURE — 700105 HCHG RX REV CODE 258

## 2020-08-06 PROCEDURE — 700102 HCHG RX REV CODE 250 W/ 637 OVERRIDE(OP): Performed by: SPECIALIST

## 2020-08-06 PROCEDURE — 85027 COMPLETE CBC AUTOMATED: CPT

## 2020-08-06 PROCEDURE — U0004 COV-19 TEST NON-CDC HGH THRU: HCPCS

## 2020-08-06 PROCEDURE — 700105 HCHG RX REV CODE 258: Performed by: SPECIALIST

## 2020-08-06 PROCEDURE — 770002 HCHG ROOM/CARE - OB PRIVATE (112)

## 2020-08-06 PROCEDURE — 36415 COLL VENOUS BLD VENIPUNCTURE: CPT

## 2020-08-06 PROCEDURE — 85025 COMPLETE CBC W/AUTO DIFF WBC: CPT

## 2020-08-06 PROCEDURE — A9270 NON-COVERED ITEM OR SERVICE: HCPCS | Performed by: SPECIALIST

## 2020-08-06 PROCEDURE — 700101 HCHG RX REV CODE 250

## 2020-08-06 PROCEDURE — 304965 HCHG RECOVERY SERVICES

## 2020-08-06 PROCEDURE — 76815 OB US LIMITED FETUS(S): CPT

## 2020-08-06 PROCEDURE — 0HQ9XZZ REPAIR PERINEUM SKIN, EXTERNAL APPROACH: ICD-10-PCS | Performed by: SPECIALIST

## 2020-08-06 PROCEDURE — 59409 OBSTETRICAL CARE: CPT

## 2020-08-06 PROCEDURE — 84112 EVAL AMNIOTIC FLUID PROTEIN: CPT

## 2020-08-06 PROCEDURE — 87653 STREP B DNA AMP PROBE: CPT

## 2020-08-06 PROCEDURE — 80053 COMPREHEN METABOLIC PANEL: CPT

## 2020-08-06 PROCEDURE — C9803 HOPD COVID-19 SPEC COLLECT: HCPCS | Performed by: SPECIALIST

## 2020-08-06 RX ORDER — ONDANSETRON 4 MG/1
4 TABLET, ORALLY DISINTEGRATING ORAL EVERY 6 HOURS PRN
Status: DISCONTINUED | OUTPATIENT
Start: 2020-08-06 | End: 2020-08-08 | Stop reason: HOSPADM

## 2020-08-06 RX ORDER — IBUPROFEN 600 MG/1
600 TABLET ORAL EVERY 6 HOURS PRN
Status: DISCONTINUED | OUTPATIENT
Start: 2020-08-06 | End: 2020-08-08 | Stop reason: HOSPADM

## 2020-08-06 RX ORDER — OXYCODONE HYDROCHLORIDE AND ACETAMINOPHEN 5; 325 MG/1; MG/1
2 TABLET ORAL EVERY 4 HOURS PRN
Status: DISCONTINUED | OUTPATIENT
Start: 2020-08-06 | End: 2020-08-08 | Stop reason: HOSPADM

## 2020-08-06 RX ORDER — BISACODYL 10 MG
10 SUPPOSITORY, RECTAL RECTAL PRN
Status: DISCONTINUED | OUTPATIENT
Start: 2020-08-06 | End: 2020-08-08 | Stop reason: HOSPADM

## 2020-08-06 RX ORDER — ONDANSETRON 2 MG/ML
4 INJECTION INTRAMUSCULAR; INTRAVENOUS EVERY 6 HOURS PRN
Status: DISCONTINUED | OUTPATIENT
Start: 2020-08-06 | End: 2020-08-08 | Stop reason: HOSPADM

## 2020-08-06 RX ORDER — OXYCODONE HYDROCHLORIDE AND ACETAMINOPHEN 5; 325 MG/1; MG/1
1 TABLET ORAL EVERY 4 HOURS PRN
Status: DISCONTINUED | OUTPATIENT
Start: 2020-08-06 | End: 2020-08-08 | Stop reason: HOSPADM

## 2020-08-06 RX ORDER — LIDOCAINE HYDROCHLORIDE 10 MG/ML
INJECTION, SOLUTION INFILTRATION; PERINEURAL
Status: COMPLETED
Start: 2020-08-06 | End: 2020-08-06

## 2020-08-06 RX ORDER — CARBOPROST TROMETHAMINE 250 UG/ML
250 INJECTION, SOLUTION INTRAMUSCULAR
Status: DISCONTINUED | OUTPATIENT
Start: 2020-08-06 | End: 2020-08-08 | Stop reason: HOSPADM

## 2020-08-06 RX ORDER — SODIUM CHLORIDE, SODIUM LACTATE, POTASSIUM CHLORIDE, CALCIUM CHLORIDE 600; 310; 30; 20 MG/100ML; MG/100ML; MG/100ML; MG/100ML
INJECTION, SOLUTION INTRAVENOUS PRN
Status: DISCONTINUED | OUTPATIENT
Start: 2020-08-06 | End: 2020-08-08 | Stop reason: HOSPADM

## 2020-08-06 RX ORDER — DEXTROSE, SODIUM CHLORIDE, SODIUM LACTATE, POTASSIUM CHLORIDE, AND CALCIUM CHLORIDE 5; .6; .31; .03; .02 G/100ML; G/100ML; G/100ML; G/100ML; G/100ML
INJECTION, SOLUTION INTRAVENOUS CONTINUOUS
Status: DISCONTINUED | OUTPATIENT
Start: 2020-08-06 | End: 2020-08-06

## 2020-08-06 RX ORDER — ACETAMINOPHEN 325 MG/1
325 TABLET ORAL EVERY 4 HOURS PRN
Status: DISCONTINUED | OUTPATIENT
Start: 2020-08-06 | End: 2020-08-08 | Stop reason: HOSPADM

## 2020-08-06 RX ORDER — SODIUM CHLORIDE 9 MG/ML
INJECTION, SOLUTION INTRAVENOUS
Status: COMPLETED
Start: 2020-08-06 | End: 2020-08-06

## 2020-08-06 RX ORDER — MISOPROSTOL 200 UG/1
600 TABLET ORAL
Status: DISCONTINUED | OUTPATIENT
Start: 2020-08-06 | End: 2020-08-08 | Stop reason: HOSPADM

## 2020-08-06 RX ORDER — SODIUM CHLORIDE, SODIUM LACTATE, POTASSIUM CHLORIDE, CALCIUM CHLORIDE 600; 310; 30; 20 MG/100ML; MG/100ML; MG/100ML; MG/100ML
INJECTION, SOLUTION INTRAVENOUS CONTINUOUS
Status: DISCONTINUED | OUTPATIENT
Start: 2020-08-06 | End: 2020-08-06

## 2020-08-06 RX ORDER — METHYLERGONOVINE MALEATE 0.2 MG/ML
0.2 INJECTION INTRAVENOUS
Status: DISCONTINUED | OUTPATIENT
Start: 2020-08-06 | End: 2020-08-08 | Stop reason: HOSPADM

## 2020-08-06 RX ORDER — MISOPROSTOL 200 UG/1
800 TABLET ORAL
Status: DISCONTINUED | OUTPATIENT
Start: 2020-08-06 | End: 2020-08-06 | Stop reason: HOSPADM

## 2020-08-06 RX ORDER — PENICILLIN G POTASSIUM 5000000 [IU]/1
5 INJECTION, POWDER, FOR SOLUTION INTRAMUSCULAR; INTRAVENOUS ONCE
Status: COMPLETED | OUTPATIENT
Start: 2020-08-06 | End: 2020-08-06

## 2020-08-06 RX ORDER — CITRIC ACID/SODIUM CITRATE 334-500MG
30 SOLUTION, ORAL ORAL EVERY 6 HOURS PRN
Status: DISCONTINUED | OUTPATIENT
Start: 2020-08-06 | End: 2020-08-06 | Stop reason: HOSPADM

## 2020-08-06 RX ORDER — PENICILLIN G POTASSIUM 5000000 [IU]/1
INJECTION, POWDER, FOR SOLUTION INTRAMUSCULAR; INTRAVENOUS
Status: COMPLETED
Start: 2020-08-06 | End: 2020-08-06

## 2020-08-06 RX ORDER — DOCUSATE SODIUM 100 MG/1
100 CAPSULE, LIQUID FILLED ORAL 2 TIMES DAILY PRN
Status: DISCONTINUED | OUTPATIENT
Start: 2020-08-06 | End: 2020-08-08 | Stop reason: HOSPADM

## 2020-08-06 RX ADMIN — PENICILLIN G POTASSIUM 5 MILLION UNITS: 5000000 INJECTION, POWDER, FOR SOLUTION INTRAMUSCULAR; INTRAVENOUS at 06:45

## 2020-08-06 RX ADMIN — IBUPROFEN 600 MG: 600 TABLET, FILM COATED ORAL at 09:10

## 2020-08-06 RX ADMIN — IBUPROFEN 600 MG: 600 TABLET, FILM COATED ORAL at 16:31

## 2020-08-06 RX ADMIN — SODIUM CHLORIDE 100 ML: 900 INJECTION INTRAVENOUS at 06:45

## 2020-08-06 RX ADMIN — OXYTOCIN 1 MILLI-UNITS/MIN: 10 INJECTION, SOLUTION INTRAMUSCULAR; INTRAVENOUS at 02:27

## 2020-08-06 RX ADMIN — LIDOCAINE HYDROCHLORIDE: 10 INJECTION, SOLUTION INFILTRATION; PERINEURAL at 07:15

## 2020-08-06 RX ADMIN — PENICILLIN G POTASSIUM 5 MILLION UNITS: 5000000 POWDER, FOR SOLUTION INTRAMUSCULAR; INTRAPLEURAL; INTRATHECAL; INTRAVENOUS at 06:45

## 2020-08-06 RX ADMIN — IBUPROFEN 600 MG: 600 TABLET, FILM COATED ORAL at 22:55

## 2020-08-06 RX ADMIN — SODIUM CHLORIDE, POTASSIUM CHLORIDE, SODIUM LACTATE AND CALCIUM CHLORIDE: 600; 310; 30; 20 INJECTION, SOLUTION INTRAVENOUS at 02:26

## 2020-08-06 RX ADMIN — OXYTOCIN 125 ML/HR: 10 INJECTION, SOLUTION INTRAMUSCULAR; INTRAVENOUS at 07:55

## 2020-08-06 RX ADMIN — FENTANYL CITRATE 100 MCG: 50 INJECTION INTRAMUSCULAR; INTRAVENOUS at 05:16

## 2020-08-06 RX ADMIN — OXYTOCIN 2000 ML/HR: 10 INJECTION, SOLUTION INTRAMUSCULAR; INTRAVENOUS at 07:21

## 2020-08-06 RX ADMIN — FENTANYL CITRATE 100 MCG: 50 INJECTION INTRAMUSCULAR; INTRAVENOUS at 02:34

## 2020-08-06 SDOH — ECONOMIC STABILITY: TRANSPORTATION INSECURITY
IN THE PAST 12 MONTHS, HAS LACK OF TRANSPORTATION KEPT YOU FROM MEETINGS, WORK, OR FROM GETTING THINGS NEEDED FOR DAILY LIVING?: PATIENT DECLINED

## 2020-08-06 SDOH — ECONOMIC STABILITY: FOOD INSECURITY: WITHIN THE PAST 12 MONTHS, YOU WORRIED THAT YOUR FOOD WOULD RUN OUT BEFORE YOU GOT MONEY TO BUY MORE.: PATIENT DECLINED

## 2020-08-06 SDOH — ECONOMIC STABILITY: FOOD INSECURITY: WITHIN THE PAST 12 MONTHS, THE FOOD YOU BOUGHT JUST DIDN'T LAST AND YOU DIDN'T HAVE MONEY TO GET MORE.: PATIENT DECLINED

## 2020-08-06 SDOH — ECONOMIC STABILITY: TRANSPORTATION INSECURITY
IN THE PAST 12 MONTHS, HAS THE LACK OF TRANSPORTATION KEPT YOU FROM MEDICAL APPOINTMENTS OR FROM GETTING MEDICATIONS?: PATIENT DECLINED

## 2020-08-06 ASSESSMENT — LIFESTYLE VARIABLES
HAVE PEOPLE ANNOYED YOU BY CRITICIZING YOUR DRINKING: NO
TOTAL SCORE: 0
TOTAL SCORE: 0
EVER HAD A DRINK FIRST THING IN THE MORNING TO STEADY YOUR NERVES TO GET RID OF A HANGOVER: NO
TOTAL SCORE: 0
ON A TYPICAL DAY WHEN YOU DRINK ALCOHOL HOW MANY DRINKS DO YOU HAVE: 0
EVER FELT BAD OR GUILTY ABOUT YOUR DRINKING: NO
HAVE YOU EVER FELT YOU SHOULD CUT DOWN ON YOUR DRINKING: NO
CONSUMPTION TOTAL: NEGATIVE
HOW MANY TIMES IN THE PAST YEAR HAVE YOU HAD 5 OR MORE DRINKS IN A DAY: 0
AVERAGE NUMBER OF DAYS PER WEEK YOU HAVE A DRINK CONTAINING ALCOHOL: 0
ALCOHOL_USE: NO
EVER_SMOKED: NEVER

## 2020-08-06 ASSESSMENT — COPD QUESTIONNAIRES
IN THE PAST 12 MONTHS DO YOU DO LESS THAN YOU USED TO BECAUSE OF YOUR BREATHING PROBLEMS: DISAGREE/UNSURE
COPD SCREENING SCORE: 0
HAVE YOU SMOKED AT LEAST 100 CIGARETTES IN YOUR ENTIRE LIFE: NO/DON'T KNOW
DURING THE PAST 4 WEEKS HOW MUCH DID YOU FEEL SHORT OF BREATH: NONE/LITTLE OF THE TIME
DO YOU EVER COUGH UP ANY MUCUS OR PHLEGM?: NO/ONLY WITH OCCASIONAL COLDS OR INFECTIONS

## 2020-08-06 ASSESSMENT — PATIENT HEALTH QUESTIONNAIRE - PHQ9
1. LITTLE INTEREST OR PLEASURE IN DOING THINGS: NOT AT ALL
SUM OF ALL RESPONSES TO PHQ9 QUESTIONS 1 AND 2: 0
2. FEELING DOWN, DEPRESSED, IRRITABLE, OR HOPELESS: NOT AT ALL
2. FEELING DOWN, DEPRESSED, IRRITABLE, OR HOPELESS: NOT AT ALL
1. LITTLE INTEREST OR PLEASURE IN DOING THINGS: NOT AT ALL
SUM OF ALL RESPONSES TO PHQ9 QUESTIONS 1 AND 2: 0

## 2020-08-06 NOTE — CARE PLAN
Problem: Altered physiologic condition related to immediate post-delivery state and potential for bleeding/hemorrhage  Goal: Patient physiologically stable as evidenced by normal lochia, palpable uterine involution and vital signs within normal limits  Outcome: PROGRESSING AS EXPECTED  Note: Fundus firm, lochia light,blood pressure and other vitals stable. . Patient intake of fluids wnl.      Problem: Alteration in comfort related to episiotomy, vaginal repair and/or after birth pains  Goal: Patient verbalizes acceptable pain level  Outcome: PROGRESSING AS EXPECTED  Note: Pain level checked , patient states pain medicine is effective with pain control.patient demonstrates improved ease of movement patient caring for baby and self with good skill.

## 2020-08-06 NOTE — OR SURGEON
Immediate Delivery Note        Estimated Blood Loss: 100ccs    Findings:  over first degree midline laceration without any nuchal cord with easy delivery of the shoulders and body with the placenta spontaneous and intact with 3vc with Apgars of 8/9 at one and five minutes respectively. Repair of the midline laceration with 3.0 Vicyrl with single interrupted sutures after injection of 20ccs of  1% Lidocaine.     Complications: None        2020 7:42 AM David Phipps M.D.

## 2020-08-06 NOTE — H&P
DATE OF ADMISSION:  2020    REASON FOR ADMISSION:  Spontaneous rupture of membranes at 36 and 5/7th weeks'   gestation confirmed with an AmniSure.    HISTORY OF PRESENT ILLNESS:  A 35-year-old  5, para 2, spontaneous    2, ectopic 1, now presenting with complaints of ruptured membranes   approximately 30 minutes prior to arrival to labor and delivery.  Once   confirmed with an AmniSure, the patient was admitted; given her    status, group B strep culture was not available.  Overall, reassuring fetal   status was appreciated.  The plan is to proceed forward with admission and   Pitocin augmentation as necessary.    PAST MEDICAL HISTORY:  Noncontributory.    PAST SURGICAL HISTORY:  Dilation and curettage in 2017.    OBSTETRICAL HISTORY:  In , at 40 weeks' gestation, the patient had normal   spontaneous vaginal delivery after 12-hour labor.  In , the patient had   another spontaneous vaginal delivery after a 2-hour labor.  She has had 2   spontaneous abortions and 1 ectopic pregnancy.    SOCIAL HISTORY:  She denies use of any alcohol, tobacco or recreational drug   use.    MEDICATIONS:  Prenatal vitamins.    ALLERGIES:  No known drug allergies.    PHYSICAL EXAMINATION:  VITAL SIGNS:  She is afebrile, hemodynamically stable.  CURRENT VITAL SIGNS:  Can be seen in electronic medical record.  HEART:  Regular rate and rhythm.  CHEST:  Clear to auscultation bilaterally.  ABDOMEN:  Soft, gravid, nontender.  PELVIC:  Sterile vaginal exam is 1, 50% and -2 station.  EXTREMITIES:  Nontender.    LABORATORY DATA:  Prenatal care labs are all in order.    ASSESSMENT AND PLAN:  A 35-year-old  5, para 2 at 36 and 5/7th weeks'   gestation with documented and confirmed spontaneous rupture of membranes.  We   will plan on proceeding forward with admission.  Overall, reassuring fetal   status is appreciated.  We will start Pitocin augmentation as necessary.        ____________________________________     MD SUDARSHAN ZHENG    DD:  08/06/2020 07:39:25  DT:  08/06/2020 09:06:59    D#:  [id number]  Job#:  [id number]

## 2020-08-06 NOTE — PROGRESS NOTES
0130: Received report from MERLIN Jacinto RN. POC discussed. IV started, lab obtained and sent. COVID swab obtained and sent. Discussed pain management. Pt unsure what she may want as pain management at this time.    0153: Bedside US done to confirm presentation. Vertex presentation confirmed.    0227: C/o pressure. SVE 1/90/-1 exam by CHAD Munoz RN.    0510: SVE, 4-5/90/0 exam by CHAD Munoz RN.    0610: Dr. Phipps updated, notified that  unable to locate GBS status, MD to call back with results.    0635: Dr. Chacon called back,. Order received to swab for GBS.    0638: GBS obtained and sent.    0644: Pt c/o pressure. SVE Complete/ BBOW exam by MERLIN Jacinto RN.    0648: Dr. Chacon called for delivery.    0655: Report given to JASON Alvarez RN.

## 2020-08-06 NOTE — PROGRESS NOTES
0655 Report received, pt care assumed. Pt breathing through u/c's c/o pressure. Dr. Phipps on his way for delivery  0707 Dr. Phipps at bedside  0708 pushing  0712  viable female  0721 Placenta delivered, pitocin bolus started.   0930 Pt up to bathroom. +void. Steady gait. Pt taught how to use annetta bottle, dermoplast spray, and tucks pads, pt demonstrated understanding.   0935 Pt transferred to PP room 313 via wheel chair and assisted to PP bed. Report to BONNIE Nguyen

## 2020-08-06 NOTE — PROGRESS NOTES
Pt is a , EDC , 36.5 wks pt of Dr. Phipps presenting to L&D with c/o of SROM approx 30 mins ago, clear fluid. Pt states +FM, -VB, and states she's feeling some cramping. Denies complications with this pregnancy. VSS. EFM and TOCO applied. SVE by RN, see flowsheets. Dr. Phipps notified of pt's arrival orders received for amnisure test.   0114- Dr. Phipps telephoned and updated on positive amnisure. Orders received to admit pt and have scan to confirm vertex presentation.   0130- Report given to MICHAEL Munoz RN, POC discussed.  0153- Ultrasound technician at bedside for fetal presentation.

## 2020-08-06 NOTE — PROGRESS NOTES
Patient transferred from labor and delivery to room 313. Patient and  oriented to room and postpartum routine. Assessment done. Patient has no c/o pain. Lochia light  Fundus firm. IV site without redness, swelling or drainage.2nd bag pitocin placed on pump at 125/hr.education information sheet reviewed with patient. Safety and security reviewed.baby placed skin to skin.

## 2020-08-06 NOTE — LACTATION NOTE
This note was copied from a baby's chart.  Asked to observe baby at breast by RN. Baby now 5 hours old and per father this is baby's second feeding. LATCH score of 8 given in labor and delivery.    Baby appears to have a deep latch in cross cradle hold. Mom denies discomfort. Occasional swallowing noted. This is mom's 3rd baby.    Plan for now is to continue breastfeeding every three hours and on demand.    Mom primarily Serbian speaking.

## 2020-08-07 PROCEDURE — 700102 HCHG RX REV CODE 250 W/ 637 OVERRIDE(OP): Performed by: SPECIALIST

## 2020-08-07 PROCEDURE — A9270 NON-COVERED ITEM OR SERVICE: HCPCS | Performed by: SPECIALIST

## 2020-08-07 PROCEDURE — 770002 HCHG ROOM/CARE - OB PRIVATE (112)

## 2020-08-07 RX ORDER — IBUPROFEN 600 MG/1
600 TABLET ORAL EVERY 6 HOURS PRN
Qty: 30 TAB | Refills: 0 | Status: SHIPPED | OUTPATIENT
Start: 2020-08-07

## 2020-08-07 RX ADMIN — IBUPROFEN 600 MG: 600 TABLET, FILM COATED ORAL at 22:42

## 2020-08-07 RX ADMIN — IBUPROFEN 600 MG: 600 TABLET, FILM COATED ORAL at 05:07

## 2020-08-07 RX ADMIN — IBUPROFEN 600 MG: 600 TABLET, FILM COATED ORAL at 16:38

## 2020-08-07 ASSESSMENT — EDINBURGH POSTNATAL DEPRESSION SCALE (EPDS)
I HAVE FELT SCARED OR PANICKY FOR NO GOOD REASON: NO, NOT AT ALL
I HAVE FELT SAD OR MISERABLE: NO, NOT AT ALL
I HAVE BLAMED MYSELF UNNECESSARILY WHEN THINGS WENT WRONG: NO, NEVER
I HAVE BEEN ANXIOUS OR WORRIED FOR NO GOOD REASON: NO, NOT AT ALL
THINGS HAVE BEEN GETTING ON TOP OF ME: NO, MOST OF THE TIME I HAVE COPED QUITE WELL
I HAVE BEEN ABLE TO LAUGH AND SEE THE FUNNY SIDE OF THINGS: AS MUCH AS I ALWAYS COULD
THE THOUGHT OF HARMING MYSELF HAS OCCURRED TO ME: NEVER
I HAVE BEEN SO UNHAPPY THAT I HAVE HAD DIFFICULTY SLEEPING: NOT AT ALL
I HAVE BEEN SO UNHAPPY THAT I HAVE BEEN CRYING: NO, NEVER
I HAVE LOOKED FORWARD WITH ENJOYMENT TO THINGS: AS MUCH AS I EVER DID

## 2020-08-07 NOTE — LACTATION NOTE
Met with MOB and FOB using  Tatyana #[id number] for Japanese language. Mother is doing a feeding plan for late  infant. Reviewed LPI information, supplemental feeding volume guidelines, and breast pump use. Mother reports nipple are sore, assisted to deepen latch and place infant in tummy to tummy positioning for improved comfort. Reviewed feeding plan to include short feeding at breast for 10 minutes then pumping and supplementation with EBM/formula per supplemental feeding volume guidelines every 3 hours. Mother reports she is established with [facility], encouraged to call to coordinate breast pump , mother reports she also has an older breast pump at home as well if needed. Denies questions/concerns at this time. LC to follow as needed.

## 2020-08-07 NOTE — PROGRESS NOTES
Progress Note    Subjective:   Doing well without any complaints. No sig bleeding or discharge. Pain well controlled.    Objective Data:  Recent Labs     08/06/20  0128 08/06/20  1821   WBC 9.7 12.9*   RBC 4.25 3.86*   HEMOGLOBIN 13.5 12.4   HEMATOCRIT 40.3 37.8   MCV 94.8 97.9*   MCH 31.8 32.1   MCHC 33.5* 32.8*   RDW 51.1* 51.9*   PLATELETCT 213 203   MPV 10.3 10.5     Recent Labs     08/06/20  0128   SODIUM 135   POTASSIUM 3.8   CHLORIDE 101   CO2 19*   GLUCOSE 78   BUN 9   CREATININE 0.33*   CALCIUM 9.1       Vitals:    08/06/20 1030 08/06/20 1400 08/06/20 1800 08/07/20 0600   BP: 101/55 100/53 101/65 (!) 95/66   Pulse: 80 88 68 80   Resp: 20 20 20 16   Temp: 36.8 °C (98.2 °F) 36.6 °C (97.9 °F) 36.2 °C (97.2 °F) 36.4 °C (97.5 °F)   TempSrc: Temporal Temporal Temporal Temporal   SpO2: 95% 96% 95% 97%   Weight:       Height:         Abdomen: soft non tender fundus at umbilicus  Perineum: no sig bleeding or discharge  Ext:non tender calves    No intake or output data in the 24 hours ending 08/07/20 0818    Current Facility-Administered Medications   Medication Dose Route Frequency Provider Last Rate Last Dose   • ondansetron (ZOFRAN ODT) dispertab 4 mg  4 mg Oral Q6HRS PRN David Phipps M.D.        Or   • ondansetron (ZOFRAN) syringe/vial injection 4 mg  4 mg Intravenous Q6HRS PRN David Phipps M.D.       • oxytocin (PITOCIN) infusion (for postpartum)   mL/hr Intravenous Continuous David Phipps M.D. 125 mL/hr at 08/06/20 0755 125 mL/hr at 08/06/20 0755   • ibuprofen (MOTRIN) tablet 600 mg  600 mg Oral Q6HRS PRN David Phipps M.D.   600 mg at 08/07/20 0507   • acetaminophen (TYLENOL) tablet 325 mg  325 mg Oral Q4HRS PRN David Phipps M.D.       • oxyCODONE-acetaminophen (PERCOCET) 5-325 MG per tablet 1 Tab  1 Tab Oral Q4HRS PRN David Phipps M.D.       • oxyCODONE-acetaminophen (PERCOCET) 5-325 MG per tablet 2 Tab  2 Tab Oral Q4HRS PRN David Phipps M.D.       • LR infusion   Intravenous  PRN David Phipps M.D.       • tetanus-dipth-acell pertussis (Tdap) inj 0.5 mL  0.5 mL Intramuscular Once PRN David Phipps M.D.       • measles, mumps and rubella vaccine (MMR) injection 0.5 mL  0.5 mL Subcutaneous Once PRN David Phipps M.D.       • miSOPROStol (CYTOTEC) tablet 600 mcg  600 mcg Rectal Once PRN David Phipps M.D.       • methylergonovine (METHERGINE) injection 0.2 mg  0.2 mg Intramuscular Once PRN David Phipps M.D.       • carboPROST (HEMABATE) injection 250 mcg  250 mcg Intramuscular Once PRN aDvid Phipps M.D.       • docusate sodium (COLACE) capsule 100 mg  100 mg Oral BID PRN David Phipps M.D.       • bisacodyl (DULCOLAX) suppository 10 mg  10 mg Rectal PRN David Phipps M.D.           A/P S/P . Doing well. She will be discharged home this pm. All questions were answered and she will f/u for 6 week pp visit.

## 2020-08-07 NOTE — CARE PLAN
Problem: Altered physiologic condition related to immediate post-delivery state and potential for bleeding/hemorrhage  Goal: Patient physiologically stable as evidenced by normal lochia, palpable uterine involution and vital signs within normal limits  Outcome: PROGRESSING AS EXPECTED  Note: Fundus firm. Lochia light to scant. Vital signs WDL.       Problem: Alteration in comfort related to episiotomy, vaginal repair and/or after birth pains  Goal: Patient is able to ambulate, care for self and infant  Outcome: PROGRESSING AS EXPECTED  Note: Pt able to ambulate, care for self and infant. Pt states her pain is controlled with prn pain medication. Pt's pain reassessed throughout this shift.

## 2020-08-07 NOTE — PROGRESS NOTES
0700 Received bedside report from BONNIE Whitmore. Discussed plan of care. Patient will call for pain medication as needed. All needs met at this time.

## 2020-08-08 VITALS
HEART RATE: 68 BPM | OXYGEN SATURATION: 95 % | TEMPERATURE: 97.7 F | SYSTOLIC BLOOD PRESSURE: 94 MMHG | HEIGHT: 63 IN | WEIGHT: 157 LBS | DIASTOLIC BLOOD PRESSURE: 65 MMHG | BODY MASS INDEX: 27.82 KG/M2 | RESPIRATION RATE: 17 BRPM

## 2020-08-08 PROCEDURE — 700102 HCHG RX REV CODE 250 W/ 637 OVERRIDE(OP): Performed by: SPECIALIST

## 2020-08-08 PROCEDURE — A9270 NON-COVERED ITEM OR SERVICE: HCPCS | Performed by: SPECIALIST

## 2020-08-08 RX ADMIN — IBUPROFEN 600 MG: 600 TABLET, FILM COATED ORAL at 06:32

## 2020-08-08 NOTE — PROGRESS NOTES
Mother and infant discharge instructions completed by Tammy (discharge RN) in [place]an, english DC instructions signed and in patient chart, parents have no other questions at this time and verbalize understanding of follow-up appointments and when to call MD; mother and infant assessment and VS at baseline; infant placed in car seat by parents and they were escorted out to car by a CNA

## 2020-08-08 NOTE — CARE PLAN
Problem: Infection  Goal: Will remain free from infection  Note: Patient has no signs/symptoms of infection. Vital signs stable. Ambulating without difficulty        Problem: Alteration in comfort related to episiotomy, vaginal repair and/or after birth pains  Goal: Patient is able to ambulate, care for self and infant  Note: Patient states pain control is adequate

## 2020-08-08 NOTE — CARE PLAN
Problem: Communication  Goal: The ability to communicate needs accurately and effectively will improve  Outcome: MET     Problem: Safety  Goal: Will remain free from injury  Outcome: MET  Goal: Will remain free from falls  Outcome: MET     Problem: Infection  Goal: Will remain free from infection  Outcome: MET     Problem: Venous Thromboembolism (VTW)/Deep Vein Thrombosis (DVT) Prevention:  Goal: Patient will participate in Venous Thrombosis (VTE)/Deep Vein Thrombosis (DVT)Prevention Measures  Outcome: MET     Problem: Bowel/Gastric:  Goal: Normal bowel function is maintained or improved  Outcome: MET  Goal: Will not experience complications related to bowel motility  Outcome: MET     Problem: Knowledge Deficit  Goal: Knowledge of disease process/condition, treatment plan, diagnostic tests, and medications will improve  Outcome: MET  Goal: Knowledge of the prescribed therapeutic regimen will improve  Outcome: MET     Problem: Discharge Barriers/Planning  Goal: Patient's continuum of care needs will be met  Outcome: MET     Problem: Altered physiologic condition related to immediate post-delivery state and potential for bleeding/hemorrhage  Goal: Patient physiologically stable as evidenced by normal lochia, palpable uterine involution and vital signs within normal limits  Outcome: MET     Problem: Potential for postpartum infection related to presence of episiotomy/vaginal tear and/or uterine contamination  Goal: Patient will be absent from signs and symptoms of infection  Outcome: MET     Problem: Alteration in comfort related to episiotomy, vaginal repair and/or after birth pains  Goal: Patient is able to ambulate, care for self and infant  Outcome: MET  Goal: Patient verbalizes acceptable pain level  Outcome: MET     Problem: Potential knowledge deficit related to lack of understanding of self and  care  Goal: Patient will verbalize understanding of self and infant care  Outcome: MET  Goal: Patient will  demonstrate ability to care for self and infant  Outcome: MET     Problem: Potential anxiety related to difficulty adapting to parental role  Goal: Patient will verbalize and demonstrate effective bonding and parenting behavior  Outcome: MET     Problem: Fluid Volume:  Goal: Will maintain balanced intake and output  Outcome: MET     Problem: Pain Management  Goal: Pain level will decrease to patient's comfort goal  Outcome: MET

## 2020-08-08 NOTE — LACTATION NOTE
Mother reports her feedings are going well, reports infant's latch is more comfortable and she feels her milk is increasing in volume. She has not been pumping much but has been supplementing infant with formula after breastfeeding per supplemental feeding volume guidelines. Encouraged to continue pumping after breastfeeding and reviewed use of home Medela pump with mother. Encouraged mother to call [facility] peer counselor for outpatient lactation support next week to evaluate progress. Consult in Slovenian with Tammy NICOLE, certified . Continue LPI feeding plan as outlined yesterday.

## 2020-08-08 NOTE — L&D DELIVERY NOTE
DATE OF SERVICE:  2020    Briefly, this is a 35-year-old  5, para 2, spontaneous  2,   ectopic 1, now admitted with grossly spontaneous rupture of membranes   confirmed by AmniSure.  The patient was admitted in active labor.  She   progressed well and arrived at complete/complete and +3.  Her group B strep   status had been unknown.  She did have a group B strep culture sent and was   given 1 dose of penicillin prior to delivery.  The patient progressed and   delivered rapidly,  subsequently undergoing a spontaneous vaginal delivery   over a first-degree midline laceration without any nuchal cord with easy   delivery of the shoulders and body.  Cord was clamped and cut.  Infant was   handed to waiting nursing staff.  Apgars were 8 and 9 at 1 and 5 minutes   respectively.  The placenta was delivered spontaneous and intact with 3-vessel   cord noted.  The repair of the midline laceration was done with 3-0 Vicryl   single interrupted sutures after injection of 20 mL of 1% lidocaine.  The   patient tolerated labor and delivery and repair well.  The estimated blood   loss for the delivery was 100 mL.       ____________________________________     MD SUDARSHAN ZHENG / MAURISIO    DD:  2020 08:18:04  DT:  2020 19:42:58    D#:  [id number]  Job#:  [id number]

## 2020-08-08 NOTE — DISCHARGE SUMMARY
DATE OF ADMISSION:  2020    DATE OF DISCHARGE:  2020    DISCHARGE DIAGNOSES:  1.  Status post spontaneous vaginal delivery.  2.  Uncomplicated postpartum course.    HISTORY OF PRESENT ILLNESS:  This is a 35-year-old  5, para 2 at 36 and   5/7th weeks' gestation with grossly spontaneous rupture of membranes, who   presented in active labor and was subsequently admitted with overall   reassuring fetal status.    PAST MEDICAL HISTORY:  Can be found in dictated history and physical.    PHYSICAL EXAMINATION:  Can be found in dictated history and physical.    ASSESSMENT AND PLAN:  A 35-year-old  5, para 2 at 36 and 5/7th weeks'   gestation who elected to proceed forward with admission given documented and   confirmed spontaneous rupture of membranes with overall reassuring fetal   status.  Plan was to start Pitocin, which was to be increased per protocol.    HOSPITAL COURSE:  The patient was admitted.  She progressed well during the   course of her admission.  She progressed well to complete-complete and +2 to   +3.  She had a forebag, which was ruptured and subsequently underwent a   spontaneous vaginal delivery over a first-degree midline laceration without   any nuchal cord with easy delivery of the shoulders and body.  Cord was   clamped and cut.  Infant was handed to the awaiting nursing staff.  Apgars   were 8 and 9 at 1 and 5 minutes respectively.  Placenta had been delivered   spontaneous and intact with 3-vessel cord.  Laceration was repaired with 3-0   Vicryl single interrupted sutures.  Estimated blood loss for the delivery was   100 mL.  Patient tolerated labor, delivery and repair well after having given   20 mL of 1% lidocaine.    On postpartum day #1, she was ambulating, voiding well, tolerating a regular   diet.  Her pain was well controlled.  She was breastfeeding well.  She was   felt to be appropriate for discharge.    DISCHARGE PLAN:  To follow up in 6 weeks.    DISCHARGE  INSTRUCTIONS:  She is to call with any increased temperature greater   100.4, increasing vaginal bleeding, abdominal pain unrelieved with any p.o.   pain medication.  Call with any other questions or concerns.       ____________________________________     MD SUDARSHAN ZHENG / MAURISIO    DD:  08/07/2020 08:15:58  DT:  08/08/2020 02:58:10    D#:  [id number]  Job#:  [id number]

## 2020-08-08 NOTE — DISCHARGE INSTRUCTIONS
POSTPARTUM DISCHARGE INSTRUCTIONS FOR MOM    YOB: 1985   Age: 35 y.o.               Admit Date: 8/5/2020     Discharge Date: 8/8/2020  Attending Doctor:  David Phipps M.D.                  Allergies:  Patient has no known allergies.    Discharged to home by car. Discharged via wheelchair, hospital escort: Yes.  Special equipment needed: Not Applicable  Belongings with: Personal  Be sure to schedule a follow-up appointment with your primary care doctor or any specialists as instructed.     Discharge Plan:   Diet Plan: Discussed  Activity Level: Discussed  Confirmed Follow up Appointment: Appointment Scheduled  Confirmed Symptoms Management: Discussed  Medication Reconciliation Updated: No (Comments)    REASONS TO CALL YOUR OBSTETRICIAN:  1.   Persistent fever or shaking chills (Temperature higher than 100.4)  2.   Heavy bleeding (soaking more than 1 pad per hour); Passing clots  3.   Foul odor from vagina  4.   Mastitis (Breast infection; breast pain, chills, fever, redness)  5.   Urinary pain, burning or frequency  6.   Severe depression longer than 24 hours    HAND WASHING  · Prior to handling the baby.  · Before breastfeeding or bottle feeding baby.  · After using the bathroom or changing the baby's diaper.        VAGINAL CARE  · Nothing inside vagina for 6 weeks: no sexual intercourse, tampons or douching.  · Bleeding may continue for 2-4 weeks.  Amount may vary.    · Call your physician for heavy bleeding which means soaking more than 1 pad per hour    BIRTH CONTROL  · It is possible to become pregnant at any time after delivery and while breastfeeding.  · Plan to discuss a method of birth control with your physician at your follow up visit. visit.    DIET AND ELIMINATION  · Eating more fiber (bran cereal, fruits, and vegetables) and drinking plenty of fluids will help to avoid constipation.  · Urinary frequency after childbirth is normal.    POSTPARTUM BLUES  During the first few days after  "birth, you may experience a sense of the \"blues\" which may include impatience, irritability or even crying.  These feeling come and go quickly.  However, as many as 1 in 10 women experience emotional symptoms known as postpartum depression.    Postpartum depression:  May start as early as the second or third day after delivery or take several weeks or months to develop.  Symptoms of \"blues\" are present, but are more intense:  Crying spells; loss of appetite; feelings of hopelessness or loss of control; fear of touching the baby; over concern or no concern at all about the baby; little or no concern about your own appearance/caring for yourself; and/or inability to sleep or excessive sleeping.  Contact your physician if you are experiencing any of these symptoms.    Crisis Hotline:  · [place] Crisis Hotline:  5-208-VIMCYKD  Or 1-778.143.1826  · [place] Crisis Hotline:  1-583.408.4603  Or 532-951-0964    PREVENTING SHAKEN BABY:  If you are angry or stressed, PUT THE BABY IN THE CRIB, step away, take some deep breaths, and wait until you are calm to care for the baby.  DO NOT SHAKE THE BABY.  You are not alone, call a supporter for help.    · Crisis Call Center 24/7 crisis line 938-482-7052 or 1-865.261.4156  · You can also text them, text \"ANSWER\" to 413219    QUIT SMOKING/TOBACCO USE:  I understand the use of any tobacco products increases my chance of suffering from future heart disease and could cause other illnesses which may shorten my life. Quitting the use of tobacco products is the single most important thing I can do to improve my health. For further information on smoking / tobacco cessation call a Toll Free Quit Line at 1-878.830.6785 (*National [facility] [place]) or 1-885.677.8262 (American Lung Association) or you can access the web based program at www.lungusa.org.    · [place] Tobacco Users Help Line:  (803) 712-6067       Toll Free: 1-492.231.3496  · Quit Tobacco Program [facility] " (233) 113-4611    DEPRESSION / SUICIDE RISK:  As you are discharged from this [facility] facility, it is important to learn how to keep safe from harming yourself.    Recognize the warning signs:  · Abrupt changes in personality, positive or negative- including increase in energy   · Giving away possessions  · Change in eating patterns- significant weight changes-  positive or negative  · Change in sleeping patterns- unable to sleep or sleeping all the time   · Unwillingness or inability to communicate  · Depression  · Unusual sadness, discouragement and loneliness  · Talk of wanting to die  · Neglect of personal appearance   · Rebelliousness- reckless behavior  · Withdrawal from people/activities they love  · Confusion- inability to concentrate     If you or a loved one observes any of these behaviors or has concerns about self-harm, here's what you can do:  · Talk about it- your feelings and reasons for harming yourself  · Remove any means that you might use to hurt yourself (examples: pills, rope, extension cords, firearm)  · Get professional help from the community (Mental Health, Substance Abuse, psychological counseling)  · Do not be alone:Call your Safe Contact- someone whom you trust who will be there for you.  · Call your local CRISIS HOTLINE 238-0504 or 307-644-4978  · Call your local Children's Mobile Crisis Response Team [place] (818) 214-8499 or www.Search123  · Call the toll free National Suicide Prevention Hotlines   · National Suicide Prevention Lifeline 623-384-IQMF (2399)  · National Hope Line Network 800-SUICIDE (722-2187)    DISCHARGE SURVEY:  Thank you for choosing [facility].  We hope we provided you with very good care.  You may be receiving a survey in the mail.  Please fill it out.  Your opinion is valuable to us.        My signature on this form indicates that:  1.  I have reviewed and understand the above information  2.  My questions regarding this information have been  answered to my satisfaction.  3.  I have formulated a plan with my discharge nurse to obtain my prescribed medication for home.

## 2020-08-08 NOTE — PROGRESS NOTES
Progress Note    Subjective:   Doing well without any concerns. No issues or concerns. No sig bleeding or discharge.    Objective Data:  Recent Labs     08/06/20  0128 08/06/20  1821   WBC 9.7 12.9*   RBC 4.25 3.86*   HEMOGLOBIN 13.5 12.4   HEMATOCRIT 40.3 37.8   MCV 94.8 97.9*   MCH 31.8 32.1   MCHC 33.5* 32.8*   RDW 51.1* 51.9*   PLATELETCT 213 203   MPV 10.3 10.5     Recent Labs     08/06/20  0128   SODIUM 135   POTASSIUM 3.8   CHLORIDE 101   CO2 19*   GLUCOSE 78   BUN 9   CREATININE 0.33*   CALCIUM 9.1       Vitals:    08/06/20 1800 08/07/20 0600 08/07/20 1800 08/08/20 0600   BP: 101/65 (!) 95/66 (!) 94/54 (!) 94/65   Pulse: 68 80 81 68   Resp: 20 16 20 17   Temp: 36.2 °C (97.2 °F) 36.4 °C (97.5 °F) 36.8 °C (98.2 °F) 36.5 °C (97.7 °F)   TempSrc: Temporal Temporal Temporal Temporal   SpO2: 95% 97% 95% 95%   Weight:       Height:         Abdomen: soft non tender fundus   Perineum: no sig bleeding or discharge  Ext:non tender calves    No intake or output data in the 24 hours ending 08/08/20 1134    Current Facility-Administered Medications   Medication Dose Route Frequency Provider Last Rate Last Dose   • ondansetron (ZOFRAN ODT) dispertab 4 mg  4 mg Oral Q6HRS PRN David Phipps M.D.        Or   • ondansetron (ZOFRAN) syringe/vial injection 4 mg  4 mg Intravenous Q6HRS PRN David Phipps M.D.       • oxytocin (PITOCIN) infusion (for postpartum)   mL/hr Intravenous Continuous David Phipps M.D. 125 mL/hr at 08/06/20 0755 125 mL/hr at 08/06/20 0755   • ibuprofen (MOTRIN) tablet 600 mg  600 mg Oral Q6HRS PRN David Phipps M.D.   600 mg at 08/08/20 0632   • acetaminophen (TYLENOL) tablet 325 mg  325 mg Oral Q4HRS PRN David Phipps M.D.       • oxyCODONE-acetaminophen (PERCOCET) 5-325 MG per tablet 1 Tab  1 Tab Oral Q4HRS PRN David Phipps M.D.       • oxyCODONE-acetaminophen (PERCOCET) 5-325 MG per tablet 2 Tab  2 Tab Oral Q4HRS PRN David Phipps M.D.       • LR infusion   Intravenous PRN  David Phipps M.D.       • tetanus-dipth-acell pertussis (Tdap) inj 0.5 mL  0.5 mL Intramuscular Once PRN David Phipps M.D.   Stopped at 20   • measles, mumps and rubella vaccine (MMR) injection 0.5 mL  0.5 mL Subcutaneous Once PRN David Phipps M.D.   Stopped at 20   • miSOPROStol (CYTOTEC) tablet 600 mcg  600 mcg Rectal Once PRN David Phipps M.D.       • methylergonovine (METHERGINE) injection 0.2 mg  0.2 mg Intramuscular Once PRN David Phipps M.D.       • carboPROST (HEMABATE) injection 250 mcg  250 mcg Intramuscular Once PRN David Phipps M.D.       • docusate sodium (COLACE) capsule 100 mg  100 mg Oral BID PRN David Phipps M.D.       • bisacodyl (DULCOLAX) suppository 10 mg  10 mg Rectal PRN David Phipps M.D.           A/P S/P . Doing well. No issues or concerns. Plan to discharge home with f/u in 6 weeks. Discharge instructions given.

## 2024-01-04 ENCOUNTER — HOSPITAL ENCOUNTER (OUTPATIENT)
Dept: RADIOLOGY | Facility: MEDICAL CENTER | Age: 39
End: 2024-01-04
Attending: NURSE PRACTITIONER
Payer: OTHER GOVERNMENT

## 2024-01-04 DIAGNOSIS — N64.52 BILATERAL NIPPLE DISCHARGE: ICD-10-CM

## 2024-01-04 PROCEDURE — 76642 ULTRASOUND BREAST LIMITED: CPT | Mod: LT

## 2024-01-04 PROCEDURE — G0279 TOMOSYNTHESIS, MAMMO: HCPCS

## 2024-02-04 NOTE — ED PROVIDER NOTES
ED Provider Note    Scribed for Zeus Calle M.D. by Zeus Calle. 7/8/2017,  12:55 AM.    CHIEF COMPLAINT  Chief Complaint   Patient presents with   • RLQ Pain     x1 day. denies bleeding. denies painful urination. 3rd pregnancy   • Pregnancy     approximate 12 weeks   • Cramping     x1 day   • Vaginal Bleeding     onset at 2300 while in triage       HPI  Kait Talley is a 32 y.o. female who presents to the Emergency Department for one day of right lower quadrant pain. She initially denied vaginal bleeding or painful urination. She does report that she is about 12 weeks pregnant. With the right lower quadrant pain, she has associated cramping. Patient did start having some vaginal bleeding about 11 PM while in triage. That was 2 hours ago now.     Her records show that she is followed by the pregnancy center and that she is type O positive. Triage blood tests included a quantitative hCG which is 466, which is not compatible with a 12 week pregnancy.    Her records show that she was seen by the pregnancy Center on July 3, and was diagnosed with a missed miscarriage in the 1st trimester by transvaginal ultrasound. There was an intrauterine gestational sac with a fetal pole but no cardiac motion. Crown-rump length with consistent with an 8 week pregnancy. The patient was offered D and E, and elected to pass the products of conception on her own. Recommended that she follow-up at the pregnancy center in 2 weeks. However, the patient denies that this conversation actually took place. She has to baby is alive, though says that she was told maybe the baby didn't have a heartbeat. She asks for an ultrasound, but I have explained that she had a very clear ultrasound that showed an 8 week fetus with no heartbeat, which is diagnostic of, especially combined with her quantitative hCG. Until that she was offered a D and E. She does not feel it was made clear to her that she had a fetal demise. She asks the  "same questions repetitively, there seems to be a very significant education deficit. She does deny passing clots or tissue. She said she had a small amount of blood in the waiting room.    REVIEW OF SYSTEMS  See HPI for further details. All other systems are negative.     PAST MEDICAL HISTORY       SOCIAL HISTORY  Social History     Social History Main Topics   • Smoking status: Never Smoker    • Smokeless tobacco: Not on file   • Alcohol Use: No   • Drug Use: No   • Sexual Activity:     Partners: Male     Birth Control/ Protection: Implant      Comment: Pt states had nexplanon removed 8/2016     History   Drug Use No       SURGICAL HISTORY  patient denies any surgical history    CURRENT MEDICATIONS  Home Medications     Reviewed by Meir Jo R.N. (Registered Nurse) on 07/08/17 at 0049  Med List Status: Complete    Medication Last Dose Status    Prenatal MV-Min-Fe Fum-FA-DHA (PRENATAL 1 PO) 7/8/2017 Active                ALLERGIES  No Known Allergies  PHYSICAL EXAM  VITAL SIGNS: /64 mmHg  Pulse 65  Temp(Src) 36.9 °C (98.4 °F)  Resp 16  Ht 1.626 m (5' 4\")  Wt 58 kg (127 lb 13.9 oz)  BMI 21.94 kg/m2  SpO2 98%  LMP 03/18/2017  Pulse ox interpretation: I interpret this pulse ox as normal.  Constitutional: Alert in no apparent distress. Anxious.  HENT: No signs of trauma, Bilateral external ears normal, Nose normal.   Eyes:  Conjunctiva normal, Non-icteric.   Neck: Normal range of motion, Supple, No stridor.   Cardiovascular: Regular rate and rhythm, no murmurs.   Thorax & Lungs: Normal breath sounds, No respiratory distress, No wheezing,   Abdomen: Bowel sounds normal, Soft, mild suprapubic tenderness, No masses, No pulsatile masses. No peritoneal signs.  Skin: Warm, Dry, No erythema, No rash.   Back: No CVA tenderness.  Extremities: Intact distal pulses, No edema, No tenderness, No cyanosis.  Musculoskeletal: Good range of motion in all major joints.   Neurologic: Alert , Normal motor function, Normal " sensory function, No focal deficits noted.   Psychiatric: Affect normal, Judgment limited, Mood normal.     DIAGNOSTIC STUDIES / PROCEDURES        LABS  Labs Reviewed   COMP METABOLIC PANEL - Abnormal; Notable for the following:     Potassium 3.5 (*)     All other components within normal limits   URINALYSIS,CULTURE IF INDICATED - Abnormal; Notable for the following:     Ketones 40 (*)     Occult Blood Moderate (*)     All other components within normal limits   URINE MICROSCOPIC (W/UA) - Abnormal; Notable for the following:     RBC 5-10 (*)     Bacteria Moderate (*)     Hyaline Cast 3-5 (*)     All other components within normal limits   CBC WITH DIFFERENTIAL   LIPASE   ESTIMATED GFR   URINALYSIS,CULTURE IF INDICATED   POC URINE PREGNANCY     All labs reviewed by me.    RADIOLOGY  No orders to display     The radiologist's interpretation of all radiological studies have been reviewed by me.    COURSE & MEDICAL DECISION MAKING  Nursing notes, VS, PMSFHx reviewed in chart.     12:55 AM Patient seen and examined at bedside. Her understanding of her fetal demise seems extremely limited. She is accompanied by her 2 children, and another friend at the bedside, and I have spent an extended amount of time explaining to them what is happening, what was found at her pregnancy center visit, and the apparent decision was made with her input to manage her expectantly. She continues to ask questions somewhat repetitively, but I do think we have come to the understanding that she needs to call the pregnancy center on Monday, if she indeed has decided that she would like to have a D and E. Her two children and friends at bedside all symptoms clearly understand the situation. There seems to be an element of educational deficit, and likely a component of denial.    1:30 AM this patient's urinalysis is unremarkable. Her bedside nurses at the same somewhat repetitive conversation with her regarding her situation, and I returned myself  to the bedside to reiterate what we had already discussed several times previously. The patient agrees to call the pregnancy center follow-up on Monday. She understands she should return to the emergency department for heavy bleeding, severe pain, uncontrolled vomiting, or fevers. She is very unhappy that no procedure can be performed here immediately. She is also very unhappy that an ultrasound was not repeated here today, but have explained why it is unnecessary.     The patient will return for new or worsening symptoms and is stable at the time of discharge.    The patient is referred to a primary physician for blood pressure management, diabetic screening, and for all other preventative health concerns.    DISPOSITION:  Patient will be discharged home in stable condition.    FOLLOW UP:  [facility] [place], M.D.  [address] [place] [address] [place] [id number]  [phone]    Call in 1 day        OUTPATIENT MEDICATIONS:  Discharge Medication List as of 2017  1:46 AM              FINAL IMPRESSION  1. Missed  with fetal demise before 20 completed weeks of gestation    2. Situational anxiety    3. Unhappiness       oral

## (undated) DEVICE — TRAY SRGPRP PVP IOD WT PRP - (20/CA)

## (undated) DEVICE — SET LEADWIRE 5 LEAD BEDSIDE DISPOSABLE ECG (1SET OF 5/EA)

## (undated) DEVICE — SUCTION INSTRUMENT YANKAUER BULBOUS TIP W/O VENT (50EA/CA)

## (undated) DEVICE — VACURETTE 9MM CURVED 10/PKG

## (undated) DEVICE — CATHETER IV 20 GA X 1-1/4 ---SURG.& SDS ONLY--- (50EA/BX)

## (undated) DEVICE — HEAD HOLDER JUNIOR/ADULT

## (undated) DEVICE — KIT  I.V. START (100EA/CA)

## (undated) DEVICE — GLOVE BIOGEL SZ 8 SURGICAL PF LTX - (50PR/BX 4BX/CA)

## (undated) DEVICE — MANIFOLD NEPTUNE 1 PORT (20/PK)

## (undated) DEVICE — SODIUM CHL IRRIGATION 0.9% 1000ML (12EA/CA)

## (undated) DEVICE — KIT ANESTHESIA W/CIRCUIT & 3/LT BAG W/FILTER (20EA/CA)

## (undated) DEVICE — KIT D & C COLLECTION (10EA/PK)

## (undated) DEVICE — CANISTER SUCTION RIGID RED 1500CC (40EA/CA)

## (undated) DEVICE — TUBING CLEARLINK DUO-VENT - C-FLO (48EA/CA)

## (undated) DEVICE — TUBE CONNECTING SUCTION - CLEAR PLASTIC STERILE 72 IN (50EA/CA)

## (undated) DEVICE — PROTECTOR ULNA NERVE - (36PR/CA)

## (undated) DEVICE — LACTATED RINGERS INJ 1000 ML - (14EA/CA 60CA/PF)

## (undated) DEVICE — LEAD SET 6 DISP. EKG NIHON KOHDEN

## (undated) DEVICE — Device

## (undated) DEVICE — GLOVE BIOGEL PI INDICATOR SZ 7.0 SURGICAL PF LF - (50/BX 4BX/CA)

## (undated) DEVICE — MASK ANESTHESIA ADULT  - (100/CA)

## (undated) DEVICE — PAD SANITARY 11IN MAXI IND WRAPPED  (12EA/PK 24PK/CA)

## (undated) DEVICE — SET EXTENSION WITH 2 PORTS (48EA/CA) ***PART #2C8610 IS A SUBSTITUTE*****

## (undated) DEVICE — TUBING D & E COLLECTION SET (50EA/PK)

## (undated) DEVICE — ELECTRODE 850 FOAM ADHESIVE - HYDROGEL RADIOTRNSPRNT (50/PK)

## (undated) DEVICE — MANIFOLD NEPTUNE 1 PORT

## (undated) DEVICE — VACURETTE 10MM CURVED 10/PKG

## (undated) DEVICE — MASK, LARYNGEAL AIRWAY #4

## (undated) DEVICE — CANISTER SUCTION 3000ML MECHANICAL FILTER AUTO SHUTOFF MEDI-VAC NONSTERILE LF DISP  (40EA/CA)

## (undated) DEVICE — SENSOR SPO2 NEO LNCS ADHESIVE (20/BX) SEE USER NOTES